# Patient Record
Sex: FEMALE | Race: BLACK OR AFRICAN AMERICAN | Employment: PART TIME | URBAN - METROPOLITAN AREA
[De-identification: names, ages, dates, MRNs, and addresses within clinical notes are randomized per-mention and may not be internally consistent; named-entity substitution may affect disease eponyms.]

---

## 2017-01-30 ENCOUNTER — OFFICE VISIT (OUTPATIENT)
Dept: FAMILY MEDICINE CLINIC | Age: 52
End: 2017-01-30

## 2017-01-30 VITALS
TEMPERATURE: 98.1 F | SYSTOLIC BLOOD PRESSURE: 171 MMHG | WEIGHT: 161 LBS | RESPIRATION RATE: 18 BRPM | HEIGHT: 62 IN | HEART RATE: 78 BPM | BODY MASS INDEX: 29.63 KG/M2 | OXYGEN SATURATION: 100 % | DIASTOLIC BLOOD PRESSURE: 90 MMHG

## 2017-01-30 DIAGNOSIS — J06.9 UPPER RESPIRATORY TRACT INFECTION, UNSPECIFIED TYPE: Primary | ICD-10-CM

## 2017-01-30 RX ORDER — PROMETHAZINE HYDROCHLORIDE AND DEXTROMETHORPHAN HYDROBROMIDE 6.25; 15 MG/5ML; MG/5ML
SYRUP ORAL
Qty: 180 ML | Refills: 0 | Status: SHIPPED | OUTPATIENT
Start: 2017-01-30 | End: 2017-09-21 | Stop reason: ALTCHOICE

## 2017-01-30 NOTE — MR AVS SNAPSHOT
Visit Information Date & Time Provider Department Dept. Phone Encounter #  
 1/30/2017 11:15 AM Stephan Portillo MD [address] [facility] [phone] [id number] Follow-up Instructions Return if symptoms worsen or fail to improve. Upcoming Health Maintenance Date Due  
 PAP AKA CERVICAL CYTOLOGY 3/30/2014 INFLUENZA AGE 9 TO ADULT 8/1/2016 DTaP/Tdap/Td series (2 - Td) 3/30/2021 Allergies as of 1/30/2017  Review Complete On: 1/30/2017 By: Stacia Segal LPN No Known Allergies Current Immunizations  Never Reviewed Name Date TDAP Vaccine 3/30/2011 Not reviewed this visit You Were Diagnosed With   
  
 Codes Comments Upper respiratory tract infection, unspecified type    -  Primary ICD-10-CM: J06.9 ICD-9-CM: 465.9 Vitals BP Pulse Temp Resp Height(growth percentile) Weight(growth percentile) 171/90 (BP 1 Location: Left arm, BP Patient Position: Sitting) 78 98.1 °F (36.7 °C) (Oral) 18 5' 2\" (1.575 m) 161 lb (73 kg) SpO2 BMI OB Status Smoking Status 100% 29.45 kg/m2 Hysterectomy Never Smoker Vitals History BMI and BSA Data Body Mass Index Body Surface Area  
 29.45 kg/m 2 1.79 m 2 Preferred Pharmacy Pharmacy Name Phone [id number] [phone] Your Updated Medication List  
  
   
This list is accurate as of: 1/30/17 12:33 PM.  Always use your most recent med list. DELMY-SELTZER PLUS COLD (PE) 2-7.8-325 mg Tbef Generic drug:  Chlorphenir-Phenylephrn-Aspirn Take  by mouth. ALPRAZolam 0.5 mg tablet Commonly known as:  Dana Mynor Take 1 Tab by mouth two (2) times daily as needed for Anxiety or Sleep. Max Daily Amount: 1 mg.  
  
 lactase 9,000 unit Chew Commonly known as:  Lactose Fast Acting Please take 1 tablet 15-20 minutes before taking any dairy product  Indications: LACTOSE INTOLERANCE  
  
 metoprolol succinate 25 mg XL tablet Commonly known as:  TOPROL-XL Take 1 Tab by mouth daily. naproxen 500 mg tablet Commonly known as:  NAPROSYN Take 500 mg by mouth two (2) times daily as needed. promethazine-dextromethorphan 6.25-15 mg/5 mL syrup Commonly known as:  PROMETHAZINE-DM  
1 teasp po qid prn cough Prescriptions Sent to Pharmacy Refills  
 promethazine-dextromethorphan (PROMETHAZINE-DM) 6.25-15 mg/5 mL syrup 0 Si teasp po qid prn cough Class: Normal  
 Pharmacy: [address] [place] [address] #: [phone] Follow-up Instructions Return if symptoms worsen or fail to improve. Introducing [facility]! Evaristo Blanton introduces [employer] patient portal. Now you can access parts of your medical record, email your doctor's office, and request medication refills online. 1. In your internet browser, go to [email or web address] 2. Click on the First Time User? Click Here link in the Sign In box. You will see the New Member Sign Up page. 3. Enter your [employer] Access Code exactly as it appears below. You will not need to use this code after youve completed the sign-up process. If you do not sign up before the expiration date, you must request a new code. · [employer] Access Code: [email or web address] Expires: 2017  3:44 PM 
 
4. Enter the last four digits of your Social Security Number (xxxx) and Date of Birth (mm/dd/yyyy) as indicated and click Submit. You will be taken to the next sign-up page. 5. Create a Fort[employer] Software ID. This will be your [employer] Software login ID and cannot be changed, so think of one that is secure and easy to remember. 6. Create a [employer] Software password. You can change your password at any time. 7. Enter your Password Reset Question and Answer. This can be used at a later time if you forget your password. 8. Enter your e-mail address. You will receive e-mail notification when new information is available in [address] 9. Click Sign Up. You can now view and download portions of your medical record. 10. Click the Download Summary menu link to download a portable copy of your medical information. If you have questions, please visit the Frequently Asked Questions section of the vogogo website. Remember, vogogo is NOT to be used for urgent needs. For medical emergencies, dial 911. Now available from your iPhone and Android! Please provide this summary of care documentation to your next provider. Your primary care clinician is listed as [facility]. If you have any questions after today's visit, please call [phone].

## 2017-01-30 NOTE — PROGRESS NOTES
HISTORY OF PRESENT ILLNESS  Judy Valles is a 54 y.o. female pt of Dr. Kathe Corona, here today w nasal congestion and non productive cough since last week, no fever. She is taking OTC Susana Tavares cold and also using nasal deongestant spray but continues to have nasal congestion. Cold Symptoms   The history is provided by the patient. This is a new problem. The current episode started more than 2 days ago. The problem has not changed since onset. There has been no fever. Review of Systems   Constitutional: Negative for fever. HENT: Positive for congestion. Respiratory: Positive for cough. Physical Exam   Constitutional: She is oriented to person, place, and time. She appears well-developed and well-nourished. /90 (BP 1 Location: Left arm, BP Patient Position: Sitting)  Pulse 78  Temp 98.1 °F (36.7 °C) (Oral)   Resp 18  Ht 5' 2\" (1.575 m)  Wt 161 lb (73 kg)  SpO2 100%  BMI 29.45 kg/m2     HENT:   Mouth/Throat: Oropharynx is clear and moist.   TMs clear bilaterally   Cardiovascular: Normal heart sounds. Pulmonary/Chest: Breath sounds normal.   Lymphadenopathy:     She has no cervical adenopathy. Neurological: She is alert and oriented to person, place, and time. Nursing note and vitals reviewed.     Patient Active Problem List   Diagnosis Code    Skin tags     Lateral epicondylitis  of elbow M77.10    Knee pain, bilateral M25.561, M25.562    Sjoegren syndrome (HCC) M35.00    SS-A antibody positive R76.8    Right shoulder pain M25.511    Cervical pain (neck) M54.2    Elevated BP I10    Panic attack as reaction to stress F41.0, F43.0    Elevated heart rate and blood pressure R09.89, I10    Panic disorder with agoraphobia F40.01     Past Medical History   Diagnosis Date    Cervical pain (neck) 1/27/2015    Elevated BP 6/6/2016    Elevated heart rate and blood pressure 6/30/2016    Panic attack as reaction to stress 6/30/2016    Panic disorder with agoraphobia 6/30/2016    Right shoulder pain 1/27/2015    Thyroid nodule      Social History     Social History    Marital status:      Spouse name: N/A    Number of children: N/A    Years of education: N/A     Social History Main Topics    Smoking status: Never Smoker    Smokeless tobacco: Never Used    Alcohol use No    Drug use: No    Sexual activity: Yes     Partners: Male     Birth control/ protection: Surgical     Other Topics Concern    None     Social History Narrative     Family History   Problem Relation Age of Onset    Hypertension Mother     Hypertension Sister      Current Outpatient Prescriptions   Medication Sig    Chlorphenir-Phenylephrn-Aspirn (DELMY-SELTZER PLUS COLD, PE,) 2-7.8-325 mg tbef Take  by mouth.  promethazine-dextromethorphan (PROMETHAZINE-DM) 6.25-15 mg/5 mL syrup 1 teasp po qid prn cough    ALPRAZolam (XANAX) 0.5 mg tablet Take 1 Tab by mouth two (2) times daily as needed for Anxiety or Sleep. Max Daily Amount: 1 mg.  metoprolol succinate (TOPROL-XL) 25 mg XL tablet Take 1 Tab by mouth daily.  lactase (LACTOSE FAST ACTING) 9,000 unit chew Please take 1 tablet 15-20 minutes before taking any dairy product  Indications: LACTOSE INTOLERANCE    naproxen (NAPROSYN) 500 mg tablet Take 500 mg by mouth two (2) times daily as needed. No Known Allergies      ASSESSMENT and PLAN  Stop nasal decongestant spray, switch to nasal steroid w saline nasal spray. Cough syrup as prescribed, follow up if not improving. Care plan reviewed and pt understands. After visit summary printed and reviewed with patient. Crayton Kussmaul was seen today for cold symptoms. Diagnoses and all orders for this visit:    Upper respiratory tract infection, unspecified type  -     promethazine-dextromethorphan (PROMETHAZINE-DM) 6.25-15 mg/5 mL syrup; 1 teasp po qid prn cough      Follow-up Disposition:  Return if symptoms worsen or fail to improve.   reviewed medications and side effects in detail

## 2017-01-30 NOTE — LETTER
NOTIFICATION RETURN TO WORK / SCHOOL 
 
1/30/2017 12:31 PM 
 
Ms. Mao Youssef [address] [place] [address] [address] [phone] To Whom It May Concern: 
 
Mao Youssef is currently under the care of [facility]. She will return to work/school on: January 31, 2017. If there are questions or concerns please have the patient contact our office.  
 
 
 
Sincerely, 
 
 
Marsha Rendon MD

## 2017-09-12 DIAGNOSIS — F40.01 PANIC DISORDER WITH AGORAPHOBIA: ICD-10-CM

## 2017-09-12 DIAGNOSIS — F43.0 PANIC ATTACK AS REACTION TO STRESS: ICD-10-CM

## 2017-09-12 DIAGNOSIS — F41.0 PANIC ATTACK AS REACTION TO STRESS: ICD-10-CM

## 2017-09-12 NOTE — TELEPHONE ENCOUNTER
Pt.states that she needs a few blood pressure pills until she see  metoprolol succinate (TOPROL-XL) 25 mg XL tablet,she said she can't get a refill right now her call back # [phone]

## 2017-09-13 RX ORDER — METOPROLOL SUCCINATE 25 MG/1
25 TABLET, EXTENDED RELEASE ORAL DAILY
Qty: 30 TAB | Refills: 0 | Status: SHIPPED | OUTPATIENT
Start: 2017-09-13 | End: 2017-09-21 | Stop reason: SDUPTHER

## 2017-09-13 NOTE — TELEPHONE ENCOUNTER
----- Message from Rashid Caruso sent at 9/12/2017  2:54 PM EDT -----  Regarding: Dr. Susanne Monteiro  Pt stated she left a message this morning stating she was out of bp medications and would like a call back from the nurse to see if a Rx could be called into [address] [address]  [phone]. Best contact number [phone].

## 2017-09-21 ENCOUNTER — OFFICE VISIT (OUTPATIENT)
Dept: FAMILY MEDICINE CLINIC | Age: 52
End: 2017-09-21

## 2017-09-21 VITALS
DIASTOLIC BLOOD PRESSURE: 89 MMHG | BODY MASS INDEX: 29.81 KG/M2 | HEART RATE: 66 BPM | WEIGHT: 162 LBS | TEMPERATURE: 97 F | HEIGHT: 62 IN | OXYGEN SATURATION: 100 % | SYSTOLIC BLOOD PRESSURE: 142 MMHG | RESPIRATION RATE: 14 BRPM

## 2017-09-21 DIAGNOSIS — F41.0 PANIC ATTACK AS REACTION TO STRESS: ICD-10-CM

## 2017-09-21 DIAGNOSIS — F43.0 PANIC ATTACK AS REACTION TO STRESS: ICD-10-CM

## 2017-09-21 DIAGNOSIS — I10 HTN, GOAL BELOW 140/90: Primary | ICD-10-CM

## 2017-09-21 DIAGNOSIS — F40.01 PANIC DISORDER WITH AGORAPHOBIA: ICD-10-CM

## 2017-09-21 LAB
BILIRUB UR QL STRIP: NEGATIVE
GLUCOSE UR-MCNC: NEGATIVE MG/DL
KETONES P FAST UR STRIP-MCNC: NORMAL MG/DL
PH UR STRIP: 5 [PH] (ref 4.6–8)
PROT UR QL STRIP: NEGATIVE MG/DL
SP GR UR STRIP: 1.02 (ref 1–1.03)
UA UROBILINOGEN AMB POC: NORMAL (ref 0.2–1)
URINALYSIS CLARITY POC: CLEAR
URINALYSIS COLOR POC: YELLOW
URINE BLOOD POC: NEGATIVE
URINE LEUKOCYTES POC: NEGATIVE
URINE NITRITES POC: NEGATIVE

## 2017-09-21 RX ORDER — METOPROLOL SUCCINATE 25 MG/1
25 TABLET, EXTENDED RELEASE ORAL DAILY
Qty: 90 TAB | Refills: 3 | Status: SHIPPED | OUTPATIENT
Start: 2017-09-21 | End: 2018-04-25 | Stop reason: SDUPTHER

## 2017-09-21 NOTE — MR AVS SNAPSHOT
Visit Information Date & Time Provider Department Dept. Phone Encounter #  
 9/21/2017 12:30 PM Jonathan Lyons MD [facility] [phone] [id number] Follow-up Instructions Return in about 1 year (around 9/21/2018), or if symptoms worsen or fail to improve. Upcoming Health Maintenance Date Due  
 PAP AKA CERVICAL CYTOLOGY 3/30/2014 DTaP/Tdap/Td series (2 - Td) 3/30/2021 Allergies as of 9/21/2017  Review Complete On: 9/21/2017 By: Stacia Murrell LPN No Known Allergies Current Immunizations  Reviewed on 9/21/2017 Name Date TDAP Vaccine 3/30/2011 Reviewed by Jonathan Lyons MD on 9/21/2017 at  1:43 PM  
You Were Diagnosed With   
  
 Codes Comments HTN, goal below 140/90    -  Primary ICD-10-CM: I10 
ICD-9-CM: 401.9 Panic attack as reaction to stress     ICD-10-CM: F41.0, F43.0 ICD-9-CM: 308.0 Panic disorder with agoraphobia     ICD-10-CM: F40.01 
ICD-9-CM: 300.21 Vitals BP Pulse Temp Resp Height(growth percentile) Weight(growth percentile) 142/89 (BP 1 Location: Left arm, BP Patient Position: At rest) 66 97 °F (36.1 °C) (Oral) 14 5' 2\" (1.575 m) 162 lb (73.5 kg) SpO2 BMI OB Status Smoking Status 100% 29.63 kg/m2 Hysterectomy Never Smoker Vitals History BMI and BSA Data Body Mass Index Body Surface Area  
 29.63 kg/m 2 1.79 m 2 Preferred Pharmacy Pharmacy Name Phone [id number] [address] [phone] Your Updated Medication List  
  
   
This list is accurate as of: 9/21/17  1:46 PM.  Always use your most recent med list.  
  
  
  
  
 ALPRAZolam 0.5 mg tablet Commonly known as:  Conchita Staelma Take 1 Tab by mouth two (2) times daily as needed for Anxiety or Sleep. Max Daily Amount: 1 mg.  
  
 lactase 9,000 unit Chew Commonly known as:  Lactose Fast Acting Please take 1 tablet 15-20 minutes before taking any dairy product  Indications: LACTOSE INTOLERANCE  
  
 metoprolol succinate 25 mg XL tablet Commonly known as:  TOPROL-XL Take 1 Tab by mouth daily. Prescriptions Sent to Pharmacy Refills  
 metoprolol succinate (TOPROL-XL) 25 mg XL tablet 3 Sig: Take 1 Tab by mouth daily. Class: Normal  
 Pharmacy: [address] [place] [address] #: [phone] Route: Oral  
  
We Performed the Following AMB POC URINALYSIS DIP STICK AUTO W/O MICRO [11556 CPT(R)] CBC W/O DIFF [55908 CPT(R)] CHOLESTEROL, TOTAL [98094 CPT(R)] HDL CHOLESTEROL [86083 CPT(R)] METABOLIC PANEL, COMPREHENSIVE [60652 CPT(R)] TSH 3RD GENERATION [41041 CPT(R)] Follow-up Instructions Return in about 1 year (around 9/21/2018), or if symptoms worsen or fail to improve. Introducing [facility]! David Hines introduces [employer] patient portal. Now you can access parts of your medical record, email your doctor's office, and request medication refills online. 1. In your internet browser, go to [email or web address] Keys 2. Click on the First Time User? Click Here link in the Sign In box. You will see the New Member Sign Up page. 3. Enter your [employer] [place] Access Code exactly as it appears below. You will not need to use this code after youve completed the sign-up process. If you do not sign up before the expiration date, you must request a new code. · [employer] Access Code: [email or web address] Expires: 12/20/2017  1:44 PM 
 
4. Enter the last four digits of your Social Security Number (xxxx) and Date of Birth (mm/dd/yyyy) as indicated and click Submit. You will be taken to the next sign-up page. 5. Create a [employer] [place] ID. This will be your [employer] login ID and cannot be changed, so think of one that is secure and easy to remember. 6. Create a Webjam password. You can change your password at any time. 7. Enter your Password Reset Question and Answer. This can be used at a later time if you forget your password. 8. Enter your e-mail address. You will receive e-mail notification when new information is available in [address] 9. Click Sign Up. You can now view and download portions of your medical record. 10. Click the Download Summary menu link to download a portable copy of your medical information. If you have questions, please visit the Frequently Asked Questions section of the Webjam website. Remember, Webjam is NOT to be used for urgent needs. For medical emergencies, dial 911. Now available from your iPhone and Android! Please provide this summary of care documentation to your next provider. Your primary care clinician is listed as Mihir Yi. If you have any questions after today's visit, please call [phone].

## 2017-09-21 NOTE — PROGRESS NOTES
HISTORY OF PRESENT ILLNESS  Sunday Qiana is a 54 y.o. female. HPI         HTN  Today pt present for Bp check and the patient stating that so far there has been a Compliancy w/ the bp meds, she is having had the low salt diet   the patient has been active   She does do the daily walking at home,    pt states that patien does not obtain the bp at home ,   today the pt denies Chest Pain, has no legs swelling no lightheadedness,  the pat has been feeling anxious,but no H, no S ideation  and  Has not been feeling stressed out,   otherwise feeling better since the last visit      Current Outpatient Prescriptions   Medication Sig Dispense Refill    metoprolol succinate (TOPROL-XL) 25 mg XL tablet Take 1 Tab by mouth daily. 90 Tab 3    ALPRAZolam (XANAX) 0.5 mg tablet Take 1 Tab by mouth two (2) times daily as needed for Anxiety or Sleep.  Max Daily Amount: 1 mg. 60 Tab 1    lactase (LACTOSE FAST ACTING) 9,000 unit chew Please take 1 tablet 15-20 minutes before taking any dairy product  Indications: LACTOSE INTOLERANCE 90 Tab 3     No Known Allergies  Past Medical History:   Diagnosis Date    Cervical pain (neck) 1/27/2015    Elevated BP 6/6/2016    Elevated heart rate and blood pressure 6/30/2016    HTN, goal below 140/90 9/21/2017    Panic attack as reaction to stress 6/30/2016    Panic disorder with agoraphobia 6/30/2016    Right shoulder pain 1/27/2015    Thyroid nodule      Past Surgical History:   Procedure Laterality Date    HX GYN      HX HYSTERECTOMY  2012    THYROIDECTOMY  2002     Family History   Problem Relation Age of Onset    Hypertension Mother     Hypertension Sister      Social History   Substance Use Topics    Smoking status: Never Smoker    Smokeless tobacco: Never Used    Alcohol use No      Lab Results  Component Value Date/Time   WBC 4.8 07/02/2016 10:57 AM   HGB 13.5 07/02/2016 10:57 AM   HCT 39.1 07/02/2016 10:57 AM   PLATELET 372 76/61/9450 10:57 AM   MCV 87.7 07/02/2016 10:57 AM     Lab Results  Component Value Date/Time   GFR est non-AA >60 07/02/2016 10:57 AM   GFR est AA >60 07/02/2016 10:57 AM   Creatinine 0.96 07/02/2016 10:57 AM   BUN 13 07/02/2016 10:57 AM   Sodium 143 07/02/2016 10:57 AM   Potassium 3.5 07/02/2016 10:57 AM   Chloride 108 07/02/2016 10:57 AM   CO2 26 07/02/2016 10:57 AM   Magnesium 2.2 07/02/2016 10:57 AM        Review of Systems   Constitutional: Negative for chills and fever. HENT: Negative for ear pain and nosebleeds. Eyes: Negative for blurred vision, pain and discharge. Respiratory: Negative for shortness of breath. Cardiovascular: Negative for chest pain and leg swelling. Gastrointestinal: Negative for constipation, diarrhea, nausea and vomiting. Genitourinary: Negative for frequency. Musculoskeletal: Negative for joint pain. Skin: Negative for itching and rash. Neurological: Negative for headaches. Psychiatric/Behavioral: Negative for depression. The patient is not nervous/anxious. Physical Exam   Constitutional: She is oriented to person, place, and time. She appears well-developed and well-nourished. HENT:   Head: Normocephalic and atraumatic. Eyes: Conjunctivae and EOM are normal.   Neck: Normal range of motion. Neck supple. Cardiovascular: Normal rate, regular rhythm and normal heart sounds. No murmur heard. Pulmonary/Chest: Effort normal and breath sounds normal.   Abdominal: Soft. Bowel sounds are normal. She exhibits no distension. Musculoskeletal: Normal range of motion. She exhibits no edema. Neurological: She is alert and oriented to person, place, and time. Skin: No erythema. Psychiatric: Her behavior is normal.   Nursing note and vitals reviewed. ASSESSMENT and PLAN  Diagnoses and all orders for this visit:    1. HTN, goal below 140/90    2.  Panic attack as reaction to stress  -     CBC W/O DIFF  -     AMB POC URINALYSIS DIP STICK AUTO W/O MICRO  -     TSH 3RD GENERATION  -     METABOLIC PANEL, COMPREHENSIVE  -     metoprolol succinate (TOPROL-XL) 25 mg XL tablet; Take 1 Tab by mouth daily.  -     CHOLESTEROL, TOTAL  -     HDL CHOLESTEROL    3. Panic disorder with agoraphobia  -     CBC W/O DIFF  -     AMB POC URINALYSIS DIP STICK AUTO W/O MICRO  -     TSH 3RD GENERATION  -     METABOLIC PANEL, COMPREHENSIVE  -     metoprolol succinate (TOPROL-XL) 25 mg XL tablet;  Take 1 Tab by mouth daily.  -     CHOLESTEROL, TOTAL  -     HDL CHOLESTEROL

## 2017-09-21 NOTE — PROGRESS NOTES
Allie Ayala          Name and  verified          Chief Complaint   Patient presents with    Hypertension         Health Maintenance reviewed-discussed with patient.       Patient stated upcoming appointment PAP exam.

## 2017-09-22 LAB
ALBUMIN SERPL-MCNC: 4.8 G/DL (ref 3.5–5.5)
ALBUMIN/GLOB SERPL: 1.8 {RATIO} (ref 1.2–2.2)
ALP SERPL-CCNC: 86 IU/L (ref 39–117)
ALT SERPL-CCNC: 19 IU/L (ref 0–32)
AST SERPL-CCNC: 20 IU/L (ref 0–40)
BILIRUB SERPL-MCNC: 0.2 MG/DL (ref 0–1.2)
BUN SERPL-MCNC: 13 MG/DL (ref 6–24)
BUN/CREAT SERPL: 15 (ref 9–23)
CALCIUM SERPL-MCNC: 9.8 MG/DL (ref 8.7–10.2)
CHLORIDE SERPL-SCNC: 103 MMOL/L (ref 96–106)
CHOLEST SERPL-MCNC: 177 MG/DL (ref 100–199)
CO2 SERPL-SCNC: 22 MMOL/L (ref 18–29)
CREAT SERPL-MCNC: 0.87 MG/DL (ref 0.57–1)
ERYTHROCYTE [DISTWIDTH] IN BLOOD BY AUTOMATED COUNT: 13.4 % (ref 12.3–15.4)
GLOBULIN SER CALC-MCNC: 2.7 G/DL (ref 1.5–4.5)
GLUCOSE SERPL-MCNC: 81 MG/DL (ref 65–99)
HCT VFR BLD AUTO: 40.1 % (ref 34–46.6)
HDLC SERPL-MCNC: 65 MG/DL
HGB BLD-MCNC: 13.6 G/DL (ref 11.1–15.9)
MCH RBC QN AUTO: 29.8 PG (ref 26.6–33)
MCHC RBC AUTO-ENTMCNC: 33.9 G/DL (ref 31.5–35.7)
MCV RBC AUTO: 88 FL (ref 79–97)
PLATELET # BLD AUTO: 284 X10E3/UL (ref 150–379)
POTASSIUM SERPL-SCNC: 3.9 MMOL/L (ref 3.5–5.2)
PROT SERPL-MCNC: 7.5 G/DL (ref 6–8.5)
RBC # BLD AUTO: 4.57 X10E6/UL (ref 3.77–5.28)
SODIUM SERPL-SCNC: 145 MMOL/L (ref 134–144)
TSH SERPL DL<=0.005 MIU/L-ACNC: 1.56 UIU/ML (ref 0.45–4.5)
WBC # BLD AUTO: 4.7 X10E3/UL (ref 3.4–10.8)

## 2018-03-06 ENCOUNTER — HOSPITAL ENCOUNTER (EMERGENCY)
Age: 53
Discharge: HOME | End: 2018-03-06
Attending: EMERGENCY MEDICINE | Admitting: EMERGENCY MEDICINE
Payer: COMMERCIAL

## 2018-03-06 VITALS
OXYGEN SATURATION: 100 % | SYSTOLIC BLOOD PRESSURE: 173 MMHG | TEMPERATURE: 98.4 F | HEIGHT: 62 IN | BODY MASS INDEX: 27.22 KG/M2 | HEART RATE: 84 BPM | WEIGHT: 147.93 LBS | RESPIRATION RATE: 16 BRPM | DIASTOLIC BLOOD PRESSURE: 107 MMHG

## 2018-03-06 DIAGNOSIS — I10 HYPERTENSION, UNSPECIFIED TYPE: ICD-10-CM

## 2018-03-06 DIAGNOSIS — Z20.2 EXPOSURE TO SEXUALLY TRANSMITTED DISEASE (STD): Primary | ICD-10-CM

## 2018-03-06 LAB
APPEARANCE UR: CLEAR
BACTERIA URNS QL MICRO: NEGATIVE /HPF
BILIRUB UR QL: NEGATIVE
CLUE CELLS VAG QL WET PREP: NORMAL
COLOR UR: ABNORMAL
EPITH CASTS URNS QL MICRO: ABNORMAL /LPF
GLUCOSE UR STRIP.AUTO-MCNC: NEGATIVE MG/DL
HGB UR QL STRIP: NEGATIVE
HYALINE CASTS URNS QL MICRO: ABNORMAL /LPF (ref 0–5)
KETONES UR QL STRIP.AUTO: NEGATIVE MG/DL
LEUKOCYTE ESTERASE UR QL STRIP.AUTO: ABNORMAL
NITRITE UR QL STRIP.AUTO: NEGATIVE
PH UR STRIP: 6 [PH] (ref 5–8)
PROT UR STRIP-MCNC: NEGATIVE MG/DL
RBC #/AREA URNS HPF: ABNORMAL /HPF (ref 0–5)
SP GR UR REFRACTOMETRY: 1.02 (ref 1–1.03)
T VAGINALIS VAG QL WET PREP: NORMAL
UA: UC IF INDICATED,UAUC: ABNORMAL
UROBILINOGEN UR QL STRIP.AUTO: 1 EU/DL (ref 0.2–1)
WBC URNS QL MICRO: ABNORMAL /HPF (ref 0–4)

## 2018-03-06 PROCEDURE — 81001 URINALYSIS AUTO W/SCOPE: CPT | Performed by: EMERGENCY MEDICINE

## 2018-03-06 PROCEDURE — 74011250637 HC RX REV CODE- 250/637: Performed by: EMERGENCY MEDICINE

## 2018-03-06 PROCEDURE — 96372 THER/PROPH/DIAG INJ SC/IM: CPT

## 2018-03-06 PROCEDURE — 74011000250 HC RX REV CODE- 250: Performed by: EMERGENCY MEDICINE

## 2018-03-06 PROCEDURE — 87210 SMEAR WET MOUNT SALINE/INK: CPT | Performed by: EMERGENCY MEDICINE

## 2018-03-06 PROCEDURE — 74011250636 HC RX REV CODE- 250/636: Performed by: EMERGENCY MEDICINE

## 2018-03-06 PROCEDURE — 99284 EMERGENCY DEPT VISIT MOD MDM: CPT

## 2018-03-06 PROCEDURE — 87591 N.GONORRHOEAE DNA AMP PROB: CPT | Performed by: EMERGENCY MEDICINE

## 2018-03-06 RX ORDER — AZITHROMYCIN 250 MG/1
1000 TABLET, FILM COATED ORAL
Status: DISCONTINUED | OUTPATIENT
Start: 2018-03-06 | End: 2018-03-06 | Stop reason: SDUPTHER

## 2018-03-06 RX ORDER — AZITHROMYCIN 250 MG/1
1000 TABLET, FILM COATED ORAL
Status: COMPLETED | OUTPATIENT
Start: 2018-03-06 | End: 2018-03-06

## 2018-03-06 RX ADMIN — AZITHROMYCIN 1000 MG: 250 TABLET, FILM COATED ORAL at 19:48

## 2018-03-06 RX ADMIN — LIDOCAINE HYDROCHLORIDE 250 MG: 10 INJECTION, SOLUTION EPIDURAL; INFILTRATION; INTRACAUDAL; PERINEURAL at 19:36

## 2018-03-06 NOTE — LETTER
[address] 
[facility] EMERGENCY DEPT 
[address] [phone] 
[phone] Work/School Note Date: 3/6/2018 To Whom It May concern: 
 
Carmen Lenz was seen and treated today in the emergency room by the following provider(s): 
Attending Provider: Oneil Bosworth, DO. Carmen Lenz may return to work on 3/7/2018. Sincerely, Femi Gonzalez RN

## 2018-03-06 NOTE — ED NOTES
Assumed care of patient. Patient is alert and oriented, does not appear to be in distress. Patient ambulatory to ED with c/o 'not feeling right\" since she found out that her  has been cheating on her. Pt states she had a hysterectomy but noticed some light pink spotting today. Pt states this happened shortly after she had intercourse x 2 weeks ago, pt states she has not had intercourse in about 1 year prior to this.

## 2018-03-06 NOTE — ED PROVIDER NOTES
EMERGENCY DEPARTMENT HISTORY AND PHYSICAL EXAM      Date: 3/6/2018  Patient Name: Mimi Palafox    History of Presenting Illness     Chief Complaint   Patient presents with    Depression     She found out in January that her  is cheating on her and ever since then she has been very depressed and anxious. She does not want to hurt herself but is just getting more and more depressed.  Anxiety       History Provided By: Patient    HPI: Mimi Palafox, 50 y.o. female with PMHx significant for panic disorder with agoraphobia, presents ambulatory to the ED with cc of intermittent vaginal spotting, vaginal discharge, and mild left sided abdominal pain x 2 weeks. Pt reports hx of hysterectomy in 2012 and states she has had no issues with either since the operation. She states she recently found text messages on her spouse's phone telling another woman he loves her and suspects physical infidelity. She notes there has been recent stressors in their marriage including financial stress and lack of intimacy. Pt denies hx of STI. Pt reports since January she has felt more anxious and dysphoric, secondary to her marital issues, but denies SI. She explains she has two children at home and would not harm herself. Pt notes she has anxiety medications at home that she takes PRN to help her sleep. She specifically denies any dysuria. PCP: Morelia Teresa MD    There are no other complaints, changes, or physical findings at this time. Current Outpatient Prescriptions   Medication Sig Dispense Refill    metoprolol succinate (TOPROL-XL) 25 mg XL tablet Take 1 Tab by mouth daily. 90 Tab 3    ALPRAZolam (XANAX) 0.5 mg tablet Take 1 Tab by mouth two (2) times daily as needed for Anxiety or Sleep.  Max Daily Amount: 1 mg. 60 Tab 1    lactase (LACTOSE FAST ACTING) 9,000 unit chew Please take 1 tablet 15-20 minutes before taking any dairy product  Indications: LACTOSE INTOLERANCE 90 Tab 3       Past History     Past Medical History:  Past Medical History:   Diagnosis Date    Cervical pain (neck) 1/27/2015    Elevated BP 6/6/2016    Elevated heart rate and blood pressure 6/30/2016    HTN, goal below 140/90 9/21/2017    Panic attack as reaction to stress 6/30/2016    Panic disorder with agoraphobia 6/30/2016    Right shoulder pain 1/27/2015    Thyroid nodule        Past Surgical History:  Past Surgical History:   Procedure Laterality Date    HX GYN      HX HYSTERECTOMY  2012    THYROIDECTOMY  2002       Family History:  Family History   Problem Relation Age of Onset    Hypertension Mother     Hypertension Sister        Social History:  Social History   Substance Use Topics    Smoking status: Never Smoker    Smokeless tobacco: Never Used    Alcohol use No       Allergies:  No Known Allergies      Review of Systems   Review of Systems   Constitutional: Negative for chills and fever. HENT: Negative for congestion and sore throat. Eyes: Negative for visual disturbance. Respiratory: Negative for cough and shortness of breath. Cardiovascular: Negative for chest pain and leg swelling. Gastrointestinal: Positive for abdominal pain. Negative for blood in stool, diarrhea and nausea. Endocrine: Negative for polyuria. Genitourinary: Positive for vaginal discharge. Negative for dysuria and flank pain.        + vaginal spotting   Musculoskeletal: Negative for myalgias. Skin: Negative for rash. Allergic/Immunologic: Negative for immunocompromised state. Neurological: Negative for weakness and headaches. Psychiatric/Behavioral: Positive for dysphoric mood. Negative for confusion and suicidal ideas. The patient is nervous/anxious. Physical Exam   Physical Exam   Constitutional: She is oriented to person, place, and time. She appears well-developed and well-nourished. HENT:   Head: Normocephalic and atraumatic.    Moist mucous membranes   Eyes: Conjunctivae are normal. Pupils are equal, round, and reactive to light. Right eye exhibits no discharge. Left eye exhibits no discharge. Neck: Normal range of motion. Neck supple. No tracheal deviation present. Cardiovascular: Normal rate, regular rhythm and normal heart sounds. No murmur heard. Pulmonary/Chest: Effort normal and breath sounds normal. No respiratory distress. She has no wheezes. She has no rales. Abdominal: Soft. Bowel sounds are normal. There is no tenderness. There is no rebound and no guarding. Musculoskeletal: Normal range of motion. She exhibits no edema, tenderness or deformity. Neurological: She is alert and oriented to person, place, and time. Skin: Skin is warm and dry. No rash noted. No erythema. Psychiatric: Her behavior is normal.   Nursing note and vitals reviewed. Diagnostic Study Results     Labs -     Recent Results (from the past 12 hour(s))   URINALYSIS W/ REFLEX CULTURE    Collection Time: 03/06/18  7:00 PM   Result Value Ref Range    Color YELLOW/STRAW      Appearance CLEAR CLEAR      Specific gravity 1.018 1.003 - 1.030      pH (UA) 6.0 5.0 - 8.0      Protein NEGATIVE  NEG mg/dL    Glucose NEGATIVE  NEG mg/dL    Ketone NEGATIVE  NEG mg/dL    Bilirubin NEGATIVE  NEG      Blood NEGATIVE  NEG      Urobilinogen 1.0 0.2 - 1.0 EU/dL    Nitrites NEGATIVE  NEG      Leukocyte Esterase TRACE (A) NEG      WBC 0-4 0 - 4 /hpf    RBC 0-5 0 - 5 /hpf    Epithelial cells FEW FEW /lpf    Bacteria NEGATIVE  NEG /hpf    UA:UC IF INDICATED CULTURE NOT INDICATED BY UA RESULT CNI      Hyaline cast 0-2 0 - 5 /lpf   WET PREP    Collection Time: 03/06/18  7:53 PM   Result Value Ref Range    Clue cells CLUE CELLS ABSENT      Wet prep NO TRICHOMONAS SEEN         Medical Decision Making   I am the first provider for this patient. I reviewed the vital signs, available nursing notes, past medical history, past surgical history, family history and social history. Vital Signs-Reviewed the patient's vital signs.   Patient Vitals for the past 12 hrs:   Temp Pulse Resp BP SpO2   03/06/18 1649 98.4 °F (36.9 °C) 84 16 (!) 173/107 100 %       Pulse Oximetry Analysis - 100% on RA    Cardiac Monitor:   Rate: 84 bpm  Rhythm: Normal Sinus Rhythm      Records Reviewed: Nursing Notes and Old Medical Records    Provider Notes (Medical Decision Making):     Pt here for STD check. Will treat empirically. ED Course:   Initial assessment performed. The patients presenting problems have been discussed, and they are in agreement with the care plan formulated and outlined with them. I have encouraged them to ask questions as they arise throughout their visit. PROGRESS NOTE:  6:45 PM  Pt requesting empiric treatment. Written by Nikki Woodard ED Scribe, as dictated by Blanca Gibson DO. Disposition:    DISCHARGE NOTE:  7:43 PM  The patient is ready for discharge. The patients signs, symptoms, diagnosis, and instructions for discharge have been discussed and the pt has conveyed their understanding. The patient is to follow up as recommended with PCP or return to the ER should their symptoms worsen. Plan has been discussed and patient has conveyed their agreement. PLAN:  1. Discharge home   2. Follow-up Information     Follow up With Details Comments Contact Info    Sandeep Butcher MD Call in 1 day  [address]  [phone]      [facility] EMERGENCY DEPT  If symptoms worsen [address] [place] [address]  [phone]        Return to ED if worse     Diagnosis     Clinical Impression:   1. Exposure to sexually transmitted disease (STD)    2. Hypertension, unspecified type        Attestations: This note is prepared by Nikki Woodard, acting as Scribe for Blanca Gibson DO. Blanca Gibson DO: The scribe's documentation has been prepared under my direction and personally reviewed by me in its entirety.  I confirm that the note above accurately reflects all work, treatment, procedures, and medical decision making performed by me.

## 2018-03-07 LAB
C TRACH DNA SPEC QL NAA+PROBE: NEGATIVE
N GONORRHOEA DNA SPEC QL NAA+PROBE: NEGATIVE
SAMPLE TYPE: NORMAL
SERVICE CMNT-IMP: NORMAL
SPECIMEN SOURCE: NORMAL

## 2018-03-07 NOTE — DISCHARGE INSTRUCTIONS
Exposure to Sexually Transmitted Infections: Care Instructions  Your Care Instructions    Sexually transmitted infections (STIs) are those diseases spread by sexual contact. There are at least 20 different STIs, including chlamydia, gonorrhea, syphilis, and human immunodeficiency virus (HIV), which causes AIDS. Bacteria-caused STIs can be treated and cured. STIs caused by viruses, such as HIV, can be treated but not cured. Some STIs can reduce a woman's chances of getting pregnant in the future. STIs are spread during sexual contact, such as vaginal intercourse and oral or anal sex. Follow-up care is a key part of your treatment and safety. Be sure to make and go to all appointments, and call your doctor if you are having problems. It's also a good idea to know your test results and keep a list of the medicines you take. How can you care for yourself at home? · Your doctor may have given you a shot of antibiotics. If your doctor prescribed antibiotic pills, take them as directed. Do not stop taking them just because you feel better. You need to take the full course of antibiotics. · Do not have sexual contact while you have symptoms of an STI or are being treated for an STI. · Tell your sex partner (or partners) that he or she will need treatment. · If you are a woman, do not douche. Douching changes the normal balance of bacteria in the vagina and may spread an infection up into your reproductive organs. To prevent exposure to STIs in the future  · Use latex condoms every time you have sex. Use them from the beginning to the end of sexual contact. · Talk to your partner before you have sex. Find out if he or she has or is at risk for any STI. Keep in mind that a person may be able to spread an STI even if he or she does not have symptoms. · Do not have sex if you are being treated for an STI. · Do not have sex with anyone who has symptoms of an STI, such as sores on the genitals or mouth.   · Having one sex partner (who does not have STIs and does not have sex with anyone else) is a good way to avoid STIs. When should you call for help? Call your doctor now or seek immediate medical care if:  ? · You have new pain in your belly or pelvis. ? · You have symptoms of a urinary tract infection. These may include:  ¨ Pain or burning when you urinate. ¨ A frequent need to urinate without being able to pass much urine. ¨ Pain in the flank, which is just below the rib cage and above the waist on either side of the back. ¨ Blood in your urine. ¨ A fever. ? · You have new or worsening pain or swelling in the scrotum. ? Watch closely for changes in your health, and be sure to contact your doctor if:  ? · You have unusual vaginal bleeding. ? · You have a discharge from the vagina or penis. ? · You have any new symptoms, such as sores, bumps, rashes, blisters, or warts. ? · You have itching, tingling, pain, or burning in the genital or anal area. ? · You think you may have an STI. Where can you learn more? Go to http://kings-tavon.info/. Enter H567 in the search box to learn more about \"Exposure to Sexually Transmitted Infections: Care Instructions. \"  Current as of: March 20, 2017  Content Version: 11.4  © 8053-5674 [employer]. Care instructions adapted under license by [employer] (which disclaims liability or warranty for this information). If you have questions about a medical condition or this instruction, always ask your healthcare professional. [place] [address] any warranty or liability for your use of this information.

## 2018-04-25 ENCOUNTER — OFFICE VISIT (OUTPATIENT)
Dept: FAMILY MEDICINE CLINIC | Age: 53
End: 2018-04-25

## 2018-04-25 VITALS
WEIGHT: 147.8 LBS | HEART RATE: 91 BPM | HEIGHT: 62 IN | BODY MASS INDEX: 27.2 KG/M2 | DIASTOLIC BLOOD PRESSURE: 102 MMHG | RESPIRATION RATE: 16 BRPM | TEMPERATURE: 98 F | SYSTOLIC BLOOD PRESSURE: 172 MMHG | OXYGEN SATURATION: 100 %

## 2018-04-25 DIAGNOSIS — F40.01 PANIC DISORDER WITH AGORAPHOBIA: ICD-10-CM

## 2018-04-25 DIAGNOSIS — I10 HTN, GOAL BELOW 140/90: ICD-10-CM

## 2018-04-25 DIAGNOSIS — F06.4 ANXIETY DISORDER DUE TO KNOWN PHYSIOLOGICAL CONDITION: ICD-10-CM

## 2018-04-25 DIAGNOSIS — F43.0 PANIC ATTACK AS REACTION TO STRESS: Primary | ICD-10-CM

## 2018-04-25 DIAGNOSIS — R19.7 DIARRHEA DUE TO MALABSORPTION: ICD-10-CM

## 2018-04-25 DIAGNOSIS — F41.0 PANIC ATTACK AS REACTION TO STRESS: Primary | ICD-10-CM

## 2018-04-25 DIAGNOSIS — K90.9 DIARRHEA DUE TO MALABSORPTION: ICD-10-CM

## 2018-04-25 PROBLEM — F41.9 ANXIETY DISORDER: Status: ACTIVE | Noted: 2018-04-25

## 2018-04-25 RX ORDER — ALPRAZOLAM 0.5 MG/1
0.5 TABLET ORAL
Qty: 30 TAB | Refills: 2 | Status: SHIPPED | OUTPATIENT
Start: 2018-04-25 | End: 2019-09-20 | Stop reason: SDUPTHER

## 2018-04-25 RX ORDER — METOPROLOL SUCCINATE 50 MG/1
50 TABLET, EXTENDED RELEASE ORAL DAILY
Qty: 30 TAB | Refills: 4 | Status: SHIPPED | OUTPATIENT
Start: 2018-04-25 | End: 2018-05-23 | Stop reason: SDUPTHER

## 2018-04-25 RX ORDER — BUPROPION HYDROCHLORIDE 150 MG/1
150 TABLET ORAL
Qty: 30 TAB | Refills: 0 | Status: SHIPPED | OUTPATIENT
Start: 2018-04-25 | End: 2018-05-23 | Stop reason: SDUPTHER

## 2018-04-25 NOTE — MR AVS SNAPSHOT
[address] [facility] [address] 
[phone] Patient: Dany Monroe MRN: [id number] :1965 Visit Information Date & Time Provider Department Dept. Phone Encounter #  
 2018 12:15 PM Waldron Brittle, MD [facility] OFFICE-ANNEX [phone] [id number] Follow-up Instructions Return in about 4 weeks (around 2018), or if symptoms worsen or fail to improve. Upcoming Health Maintenance Date Due  
 PAP AKA CERVICAL CYTOLOGY 3/30/2014 BREAST CANCER SCRN MAMMOGRAM 2017 FOBT Q 1 YEAR AGE 50-75 2017 DTaP/Tdap/Td series (2 - Td) 3/30/2021 Allergies as of 2018  Review Complete On: 2018 By: Sanjuana Sanchez LPN No Known Allergies Current Immunizations  Reviewed on 2017 Name Date TDAP Vaccine 3/30/2011 Not reviewed this visit You Were Diagnosed With   
  
 Codes Comments Panic attack as reaction to stress    -  Primary ICD-10-CM: F41.0, F43.0 ICD-9-CM: 308.0 Anxiety disorder due to known physiological condition     ICD-10-CM: F06.4 ICD-9-CM: 300.00 Panic disorder with agoraphobia     ICD-10-CM: F40.01 
ICD-9-CM: 300.21 Vitals BP Pulse Temp Resp Height(growth percentile) Weight(growth percentile) (!) 172/107 (BP 1 Location: Left arm, BP Patient Position: At rest) 91 98 °F (36.7 °C) (Oral) 16 5' 2\" (1.575 m) 147 lb 12.8 oz (67 kg) SpO2 BMI OB Status Smoking Status 100% 27.03 kg/m2 Hysterectomy Never Smoker BMI and BSA Data Body Mass Index Body Surface Area  
 27.03 kg/m 2 1.71 m 2 Preferred Pharmacy Pharmacy Name Phone [id number] [address] [phone] Your Updated Medication List  
  
   
This list is accurate as of 18  1:24 PM.  Always use your most recent med list.  
  
  
  
  
 ALPRAZolam 0.5 mg tablet Commonly known as:  Jestine Pies Take 1 Tab by mouth nightly as needed for Anxiety or Sleep. Max Daily Amount: 0.5 mg.  
  
 buPROPion  mg tablet Commonly known as:  Luis F Butt Take 1 Tab by mouth every morning. lactase 9,000 unit Chew Commonly known as:  Lactose Fast Acting Please take 1 tablet 15-20 minutes before taking any dairy product  Indications: LACTOSE INTOLERANCE  
  
 metoprolol succinate 50 mg XL tablet Commonly known as:  TOPROL-XL Take 1 Tab by mouth daily. Prescriptions Printed Refills ALPRAZolam (XANAX) 0.5 mg tablet 2 Sig: Take 1 Tab by mouth nightly as needed for Anxiety or Sleep. Max Daily Amount: 0.5 mg.  
 Class: Print Route: Oral  
  
Prescriptions Sent to Pharmacy Refills  
 metoprolol succinate (TOPROL-XL) 50 mg XL tablet 4 Sig: Take 1 Tab by mouth daily. Class: Normal  
 Pharmacy: [address] [place] [address] #: [phone] Route: Oral  
 buPROPion XL (WELLBUTRIN XL) 150 mg tablet 0 Sig: Take 1 Tab by mouth every morning. Class: Normal  
 Pharmacy: [address] [place] [address] #: [phone] Route: Oral  
  
Follow-up Instructions Return in about 4 weeks (around 5/23/2018), or if symptoms worsen or fail to improve. Patient Instructions Anxiety Disorder: Care Instructions Your Care Instructions Anxiety is a normal reaction to stress. Difficult situations can cause you to have symptoms such as sweaty palms and a nervous feeling. In an anxiety disorder, the symptoms are far more severe. Constant worry, muscle tension, trouble sleeping, nausea and diarrhea, and other symptoms can make normal daily activities difficult or impossible. These symptoms may occur for no reason, and they can affect your work, school, or social life. Medicines, counseling, and self-care can all help. Follow-up care is a key part of your treatment and safety. Be sure to make and go to all appointments, and call your doctor if you are having problems. It's also a good idea to know your test results and keep a list of the medicines you take. How can you care for yourself at home? · Take medicines exactly as directed. Call your doctor if you think you are having a problem with your medicine. · Go to your counseling sessions and follow-up appointments. · Recognize and accept your anxiety. Then, when you are in a situation that makes you anxious, say to yourself, \"This is not an emergency. I feel uncomfortable, but I am not in danger. I can keep going even if I feel anxious. \" · Be kind to your body: ¨ Relieve tension with exercise or a massage. ¨ Get enough rest. 
¨ Avoid alcohol, caffeine, nicotine, and illegal drugs. They can increase your anxiety level and cause sleep problems. ¨ Learn and do relaxation techniques. See below for more about these techniques. · Engage your mind. Get out and do something you enjoy. Go to a [employer] movie, or take a walk or hike. Plan your day. Having too much or too little to do can make you anxious. · Keep a record of your symptoms. Discuss your fears with a good friend or family member, or join a support group for people with similar problems. Talking to others sometimes relieves stress. · Get involved in social groups, or volunteer to help others. Being alone sometimes makes things seem worse than they are. · Get at least 30 minutes of exercise on most days of the week to relieve stress. Walking is a good choice. You also may want to do other activities, such as running, swimming, cycling, or playing tennis or team sports. Relaxation techniques Do relaxation exercises 10 to 20 minutes a day. You can play soothing, relaxing music while you do them, if you wish. · Tell others in your house that you are going to do your relaxation exercises. Ask them not to disturb you. · Find a comfortable place, away from all distractions and noise. · Lie down on your back, or sit with your back straight. · Focus on your breathing. Make it slow and steady. · Breathe in through your nose. Breathe out through either your nose or mouth. · Breathe deeply, filling up the area between your navel and your rib cage. Breathe so that your belly goes up and down. · Do not hold your breath. · Breathe like this for 5 to 10 minutes. Notice the feeling of calmness throughout your whole body. As you continue to breathe slowly and deeply, relax by doing the following for another 5 to 10 minutes: · Tighten and relax each muscle group in your body. You can begin at your toes and work your way up to your head. · Imagine your muscle groups relaxing and becoming heavy. · Empty your mind of all thoughts. · Let yourself relax more and more deeply. · Become aware of the state of calmness that surrounds you. · When your relaxation time is over, you can bring yourself back to alertness by moving your fingers and toes and then your hands and feet and then stretching and moving your entire body. Sometimes people fall asleep during relaxation, but they usually wake up shortly afterward. · Always give yourself time to return to full alertness before you drive a car or do anything that might cause an accident if you are not fully alert. Never play a relaxation tape while you drive a car. When should you call for help? Call 911 anytime you think you may need emergency care. For example, call if: 
? · You feel you cannot stop from hurting yourself or someone else. ? Keep the numbers for these national suicide hotlines: 4-849-124-TALK (5-163.449.9798) and 5-279-BOREYMQ (0-323.277.3935). If you or someone you know talks about suicide or feeling hopeless, get help right away. ? Watch closely for changes in your health, and be sure to contact your doctor if: 
? · You have anxiety or fear that affects your life. ? · You have symptoms of anxiety that are new or different from those you had before. Where can you learn more? Go to http://kings-tavon.info/. Enter P754 in the search box to learn more about \"Anxiety Disorder: Care Instructions. \" Current as of: May 12, 2017 Content Version: 11.4 © 4404-5366 [employer]. Care instructions adapted under license by [employer] (which disclaims liability or warranty for this information). If you have questions about a medical condition or this instruction, always ask your healthcare professional. [address] any warranty or liability for your use of this information. Body Mass Index: Care Instructions Your Care Instructions Body mass index (BMI) can help you see if your weight is raising your risk for health problems. It uses a formula to compare how much you weigh with how tall you are. · A BMI lower than 18.5 is considered underweight. · A BMI between 18.5 and 24.9 is considered healthy. · A BMI between 25 and 29.9 is considered overweight. A BMI of 30 or higher is considered obese. If your BMI is in the normal range, it means that you have a lower risk for weight-related health problems. If your BMI is in the overweight or obese range, you may be at increased risk for weight-related health problems, such as high blood pressure, heart disease, stroke, arthritis or joint pain, and diabetes. If your BMI is in the underweight range, you may be at increased risk for health problems such as fatigue, lower protection (immunity) against illness, muscle loss, bone loss, hair loss, and hormone problems. BMI is just one measure of your risk for weight-related health problems. You may be at higher risk for health problems if you are not active, you eat an unhealthy diet, or you drink too much alcohol or use tobacco products. Follow-up care is a key part of your treatment and safety.  Be sure to make and go to all appointments, and call your doctor if you are having problems. It's also a good idea to know your test results and keep a list of the medicines you take. How can you care for yourself at home? · Practice healthy eating habits. This includes eating plenty of fruits, vegetables, whole grains, lean protein, and low-fat dairy. · If your doctor recommends it, get more exercise. Walking is a good choice. Bit by bit, increase the amount you walk every day. Try for at least 30 minutes on most days of the week. · Do not smoke. Smoking can increase your risk for health problems. If you need help quitting, talk to your doctor about stop-smoking programs and medicines. These can increase your chances of quitting for good. · Limit alcohol to 2 drinks a day for men and 1 drink a day for women. Too much alcohol can cause health problems. If you have a BMI higher than 25 · Your doctor may do other tests to check your risk for weight-related health problems. This may include measuring the distance around your waist. A waist measurement of more than 40 inches in men or 35 inches in women can increase the risk of weight-related health problems. · Talk with your doctor about steps you can take to stay healthy or improve your health. You may need to make lifestyle changes to lose weight and stay healthy, such as changing your diet and getting regular exercise. If you have a BMI lower than 18.5 · Your doctor may do other tests to check your risk for health problems. · Talk with your doctor about steps you can take to stay healthy or improve your health. You may need to make lifestyle changes to gain or maintain weight and stay healthy, such as getting more healthy foods in your diet and doing exercises to build muscle. Where can you learn more? Go to http://kings-tavon.info/. Enter S176 in the search box to learn more about \"Body Mass Index: Care Instructions. \" 
 Current as of: October 13, 2016 Content Version: 11.4 © 5117-5677 Healthwise, HealthFusion. Care instructions adapted under license by [employer] (which disclaims liability or warranty for this information). If you have questions about a medical condition or this instruction, always ask your healthcare professional. [address] any warranty or liability for your use of this information. Introducing [facility]! [employer] introduces Intivix patient portal. Now you can access parts of your medical record, email your doctor's office, and request medication refills online. 1. In your internet browser, go to [email or web address] 2. Click on the First Time User? Click Here link in the Sign In box. You will see the New Member Sign Up page. 3. Enter your Intivix Access Code exactly as it appears below. You will not need to use this code after youve completed the sign-up process. If you do not sign up before the expiration date, you must request a new code. · Intivix Access Code: [email or web address] Expires: 6/4/2018  8:06 PM 
 
4. Enter the last four digits of your Social Security Number (xxxx) and Date of Birth (mm/dd/yyyy) as indicated and click Submit. You will be taken to the next sign-up page. 5. Create a Intivix ID. This will be your Intivix login ID and cannot be changed, so think of one that is secure and easy to remember. 6. Create a Intivix password. You can change your password at any time. 7. Enter your Password Reset Question and Answer. This can be used at a later time if you forget your password. 8. Enter your e-mail address. You will receive e-mail notification when new information is available in [address] 9. Click Sign Up. You can now view and download portions of your medical record. 10. Click the Download Summary menu link to download a portable copy of your medical information. If you have questions, please visit the Frequently Asked Questions section of the ponUpt website. Remember, [employer] is NOT to be used for urgent needs. For medical emergencies, dial 911. Now available from your iPhone and Android! Please provide this summary of care documentation to your next provider. Your primary care clinician is listed as Brittany Castle. If you have any questions after today's visit, please call [phone].

## 2018-04-25 NOTE — PROGRESS NOTES
Name and  verified        Chief Complaint   Patient presents with    Anxiety     patient tearful due to  of (7) unfaithful to marriage         Health Maintenance reviewed-discussed with patient.

## 2018-04-25 NOTE — PATIENT INSTRUCTIONS
Anxiety Disorder: Care Instructions  Your Care Instructions    Anxiety is a normal reaction to stress. Difficult situations can cause you to have symptoms such as sweaty palms and a nervous feeling. In an anxiety disorder, the symptoms are far more severe. Constant worry, muscle tension, trouble sleeping, nausea and diarrhea, and other symptoms can make normal daily activities difficult or impossible. These symptoms may occur for no reason, and they can affect your work, school, or social life. Medicines, counseling, and self-care can all help. Follow-up care is a key part of your treatment and safety. Be sure to make and go to all appointments, and call your doctor if you are having problems. It's also a good idea to know your test results and keep a list of the medicines you take. How can you care for yourself at home? · Take medicines exactly as directed. Call your doctor if you think you are having a problem with your medicine. · Go to your counseling sessions and follow-up appointments. · Recognize and accept your anxiety. Then, when you are in a situation that makes you anxious, say to yourself, \"This is not an emergency. I feel uncomfortable, but I am not in danger. I can keep going even if I feel anxious. \"  · Be kind to your body:  ¨ Relieve tension with exercise or a massage. ¨ Get enough rest.  ¨ Avoid alcohol, caffeine, nicotine, and illegal drugs. They can increase your anxiety level and cause sleep problems. ¨ Learn and do relaxation techniques. See below for more about these techniques. · Engage your mind. Get out and do something you enjoy. Go to a funny movie, or take a walk or hike. Plan your day. Having too much or too little to do can make you anxious. · Keep a record of your symptoms. Discuss your fears with a good friend or family member, or join a support group for people with similar problems. Talking to others sometimes relieves stress.   · Get involved in social groups, or volunteer to help others. Being alone sometimes makes things seem worse than they are. · Get at least 30 minutes of exercise on most days of the week to relieve stress. Walking is a good choice. You also may want to do other activities, such as running, swimming, cycling, or playing tennis or team sports. Relaxation techniques  Do relaxation exercises 10 to 20 minutes a day. You can play soothing, relaxing music while you do them, if you wish. · Tell others in your house that you are going to do your relaxation exercises. Ask them not to disturb you. · Find a comfortable place, away from all distractions and noise. · Lie down on your back, or sit with your back straight. · Focus on your breathing. Make it slow and steady. · Breathe in through your nose. Breathe out through either your nose or mouth. · Breathe deeply, filling up the area between your navel and your rib cage. Breathe so that your belly goes up and down. · Do not hold your breath. · Breathe like this for 5 to 10 minutes. Notice the feeling of calmness throughout your whole body. As you continue to breathe slowly and deeply, relax by doing the following for another 5 to 10 minutes:  · Tighten and relax each muscle group in your body. You can begin at your toes and work your way up to your head. · Imagine your muscle groups relaxing and becoming heavy. · Empty your mind of all thoughts. · Let yourself relax more and more deeply. · Become aware of the state of calmness that surrounds you. · When your relaxation time is over, you can bring yourself back to alertness by moving your fingers and toes and then your hands and feet and then stretching and moving your entire body. Sometimes people fall asleep during relaxation, but they usually wake up shortly afterward. · Always give yourself time to return to full alertness before you drive a car or do anything that might cause an accident if you are not fully alert.  Never play a relaxation tape while you drive a car. When should you call for help? Call 911 anytime you think you may need emergency care. For example, call if:  ? · You feel you cannot stop from hurting yourself or someone else. ? Keep the numbers for these national suicide hotlines: 4-068-487-TALK (5-492.538.6677) and 6-515-VICQHQG (5-987.318.8671). If you or someone you know talks about suicide or feeling hopeless, get help right away. ? Watch closely for changes in your health, and be sure to contact your doctor if:  ? · You have anxiety or fear that affects your life. ? · You have symptoms of anxiety that are new or different from those you had before. Where can you learn more? Go to http://kings-tavon.info/. Enter P754 in the search box to learn more about \"Anxiety Disorder: Care Instructions. \"  Current as of: May 12, 2017  Content Version: 11.4  © 1780-0766 ClearStar. Care instructions adapted under license by [employer] (which disclaims liability or warranty for this information). If you have questions about a medical condition or this instruction, always ask your healthcare professional. [address] any warranty or liability for your use of this information. Body Mass Index: Care Instructions  Your Care Instructions    Body mass index (BMI) can help you see if your weight is raising your risk for health problems. It uses a formula to compare how much you weigh with how tall you are. · A BMI lower than 18.5 is considered underweight. · A BMI between 18.5 and 24.9 is considered healthy. · A BMI between 25 and 29.9 is considered overweight. A BMI of 30 or higher is considered obese. If your BMI is in the normal range, it means that you have a lower risk for weight-related health problems.  If your BMI is in the overweight or obese range, you may be at increased risk for weight-related health problems, such as high blood pressure, heart disease, stroke, arthritis or joint pain, and diabetes. If your BMI is in the underweight range, you may be at increased risk for health problems such as fatigue, lower protection (immunity) against illness, muscle loss, bone loss, hair loss, and hormone problems. BMI is just one measure of your risk for weight-related health problems. You may be at higher risk for health problems if you are not active, you eat an unhealthy diet, or you drink too much alcohol or use tobacco products. Follow-up care is a key part of your treatment and safety. Be sure to make and go to all appointments, and call your doctor if you are having problems. It's also a good idea to know your test results and keep a list of the medicines you take. How can you care for yourself at home? · Practice healthy eating habits. This includes eating plenty of fruits, vegetables, whole grains, lean protein, and low-fat dairy. · If your doctor recommends it, get more exercise. Walking is a good choice. Bit by bit, increase the amount you walk every day. Try for at least 30 minutes on most days of the week. · Do not smoke. Smoking can increase your risk for health problems. If you need help quitting, talk to your doctor about stop-smoking programs and medicines. These can increase your chances of quitting for good. · Limit alcohol to 2 drinks a day for men and 1 drink a day for women. Too much alcohol can cause health problems. If you have a BMI higher than 25  · Your doctor may do other tests to check your risk for weight-related health problems. This may include measuring the distance around your waist. A waist measurement of more than 40 inches in men or 35 inches in women can increase the risk of weight-related health problems. · Talk with your doctor about steps you can take to stay healthy or improve your health.  You may need to make lifestyle changes to lose weight and stay healthy, such as changing your diet and getting regular exercise. If you have a BMI lower than 18.5  · Your doctor may do other tests to check your risk for health problems. · Talk with your doctor about steps you can take to stay healthy or improve your health. You may need to make lifestyle changes to gain or maintain weight and stay healthy, such as getting more healthy foods in your diet and doing exercises to build muscle. Where can you learn more? Go to http://kings-tavon.info/. Enter S176 in the search box to learn more about \"Body Mass Index: Care Instructions. \"  Current as of: October 13, 2016  Content Version: 11.4  © 0192-9224 [employer]. Care instructions adapted under license by [employer] (which disclaims liability or warranty for this information). If you have questions about a medical condition or this instruction, always ask your healthcare professional. [address] any warranty or liability for your use of this information.

## 2018-04-25 NOTE — PROGRESS NOTES
HISTORY OF PRESENT ILLNESS  oCdy Haley is a 50 y.o. female. HPI   Depression with Anxiety state, with uncontrolled bp in addition to have loose bm, started in jan of 2018 when she found out that her  of 15yrs has been have an affair with another women he leaves the house in 4am and does not come home till 1-2 am, she seen a love letters from other lady, has been having diarrhea since then palpitation and elevated bp currently on bb once daily and xanax 1-2 x per wk prn but recently has been taking it nightly often to get a good night of sleep, stating that she doesnot like her daily panic attacks at all at this time, and willing to be medicated for a better outcom    Patient state that thins are not getting better: pt states that  unable to sleep, , unable to concentrate at work and at home at this time, +feeling anxius, no guilty feeling, has nl interest to do things, not depressed, abnl appetite,  No Hoplessness, not wanted to heart self or others, no weight gain or loss, and is not related to social or perfomramce issues,     In addition pt states that there is no etoh or illicit drug use, not the first time,  no hx of physical nor of mental abuse, and currently is feeling safe in general.  Marriage of 16 yrs, sharing a mortgage of her house together, both owns the house, he is never home,  does not want to see counselor for resolution, working for [employer] at this time, r,       Current Outpatient Prescriptions   Medication Sig Dispense Refill    metoprolol succinate (TOPROL-XL) 25 mg XL tablet Take 1 Tab by mouth daily. 90 Tab 3    ALPRAZolam (XANAX) 0.5 mg tablet Take 1 Tab by mouth two (2) times daily as needed for Anxiety or Sleep.  Max Daily Amount: 1 mg. 60 Tab 1    lactase (LACTOSE FAST ACTING) 9,000 unit chew Please take 1 tablet 15-20 minutes before taking any dairy product  Indications: LACTOSE INTOLERANCE 90 Tab 3     No Known Allergies  Past Medical History:   Diagnosis Date    Anxiety disorder 4/25/2018    Cervical pain (neck) 1/27/2015    Elevated BP 6/6/2016    Elevated heart rate and blood pressure 6/30/2016    HTN, goal below 140/90 9/21/2017    Panic attack as reaction to stress 6/30/2016    Panic disorder with agoraphobia 6/30/2016    Right shoulder pain 1/27/2015    Thyroid nodule      Past Surgical History:   Procedure Laterality Date    HX GYN      HX HYSTERECTOMY  2012    THYROIDECTOMY  2002     Family History   Problem Relation Age of Onset    Hypertension Mother     Hypertension Sister      Social History   Substance Use Topics    Smoking status: Never Smoker    Smokeless tobacco: Never Used    Alcohol use No      Lab Results  Component Value Date/Time   WBC 4.7 09/21/2017 01:52 PM   HGB 13.6 09/21/2017 01:52 PM   HCT 40.1 09/21/2017 01:52 PM   PLATELET 833 78/84/0507 01:52 PM   MCV 88 09/21/2017 01:52 PM     Lab Results  Component Value Date/Time   Glucose 81 09/21/2017 01:52 PM   LDL, calculated 116 (H) 03/30/2011 04:19 PM   Creatinine 0.87 09/21/2017 01:52 PM         Review of Systems   Constitutional: Negative for chills and fever. HENT: Negative for congestion and nosebleeds. Eyes: Negative for blurred vision and pain. Respiratory: Negative for cough, shortness of breath and wheezing. Cardiovascular: Negative for chest pain and leg swelling. Gastrointestinal: Negative for constipation, diarrhea, nausea and vomiting. Genitourinary: Negative for dysuria and frequency. Musculoskeletal: Negative for joint pain and myalgias. Skin: Negative for itching and rash. Neurological: Negative for dizziness, loss of consciousness and headaches. Psychiatric/Behavioral: Negative for depression. The patient is not nervous/anxious and does not have insomnia. Physical Exam   Constitutional: She is oriented to person, place, and time. She appears well-developed and well-nourished. HENT:   Head: Normocephalic and atraumatic.    Eyes: Conjunctivae and EOM are normal. Pupils are equal, round, and reactive to light. Neck: No JVD present. No thyromegaly present. Cardiovascular: Normal rate, regular rhythm, normal heart sounds and intact distal pulses. Exam reveals no gallop and no friction rub. No murmur heard. Pulmonary/Chest: Effort normal and breath sounds normal. No stridor. No respiratory distress. She has no wheezes. She has no rales. Abdominal: Soft. Bowel sounds are normal. She exhibits no distension and no mass. There is no tenderness. Musculoskeletal: Normal range of motion. She exhibits no edema or tenderness. Lymphadenopathy:     She has no cervical adenopathy. Neurological: She is alert and oriented to person, place, and time. She has normal reflexes. No cranial nerve deficit. Skin: No rash noted. No erythema. Psychiatric: She has a normal mood and affect. Her behavior is normal.   Nursing note and vitals reviewed. ASSESSMENT and PLAN  Diagnoses and all orders for this visit:    1. Panic attack as reaction to stress  -     ALPRAZolam (XANAX) 0.5 mg tablet; Take 1 Tab by mouth nightly as needed for Anxiety or Sleep. Max Daily Amount: 0.5 mg.  -     metoprolol succinate (TOPROL-XL) 50 mg XL tablet; Take 1 Tab by mouth daily. -     buPROPion XL (WELLBUTRIN XL) 150 mg tablet; Take 1 Tab by mouth every morning. 2. Anxiety disorder due to known physiological condition  -     ALPRAZolam (XANAX) 0.5 mg tablet; Take 1 Tab by mouth nightly as needed for Anxiety or Sleep. Max Daily Amount: 0.5 mg.  -     metoprolol succinate (TOPROL-XL) 50 mg XL tablet; Take 1 Tab by mouth daily. -     buPROPion XL (WELLBUTRIN XL) 150 mg tablet; Take 1 Tab by mouth every morning. 3. Panic disorder with agoraphobia  -     ALPRAZolam (XANAX) 0.5 mg tablet; Take 1 Tab by mouth nightly as needed for Anxiety or Sleep. Max Daily Amount: 0.5 mg.  -     metoprolol succinate (TOPROL-XL) 50 mg XL tablet; Take 1 Tab by mouth daily.   -     buPROPion XL (WELLBUTRIN XL) 150 mg tablet; Take 1 Tab by mouth every morning. 4. HTN, goal below 140/90  -     ALPRAZolam (XANAX) 0.5 mg tablet; Take 1 Tab by mouth nightly as needed for Anxiety or Sleep. Max Daily Amount: 0.5 mg.  -     metoprolol succinate (TOPROL-XL) 50 mg XL tablet; Take 1 Tab by mouth daily. -     buPROPion XL (WELLBUTRIN XL) 150 mg tablet; Take 1 Tab by mouth every morning. 5. Diarrhea due to malabsorption  -     ALPRAZolam (XANAX) 0.5 mg tablet; Take 1 Tab by mouth nightly as needed for Anxiety or Sleep. Max Daily Amount: 0.5 mg.  -     metoprolol succinate (TOPROL-XL) 50 mg XL tablet; Take 1 Tab by mouth daily. -     buPROPion XL (WELLBUTRIN XL) 150 mg tablet; Take 1 Tab by mouth every morning.       Depression with anxiety in a stable condition, not suicidal, start antianxiety and calming medication for now will reevaluate in 4 weeks for progression or the side effects of the medication, in addition Counseling , social support, spiritual belonging, inc physical activity, all offered,  compliance with meds advised, was told to call for any concern

## 2018-05-23 ENCOUNTER — OFFICE VISIT (OUTPATIENT)
Dept: FAMILY MEDICINE CLINIC | Age: 53
End: 2018-05-23

## 2018-05-23 VITALS
TEMPERATURE: 97.5 F | RESPIRATION RATE: 16 BRPM | HEIGHT: 62 IN | WEIGHT: 148.8 LBS | DIASTOLIC BLOOD PRESSURE: 86 MMHG | SYSTOLIC BLOOD PRESSURE: 148 MMHG | HEART RATE: 69 BPM | BODY MASS INDEX: 27.38 KG/M2

## 2018-05-23 DIAGNOSIS — F43.0 PANIC ATTACK AS REACTION TO STRESS: ICD-10-CM

## 2018-05-23 DIAGNOSIS — K90.9 DIARRHEA DUE TO MALABSORPTION: ICD-10-CM

## 2018-05-23 DIAGNOSIS — Z12.39 SCREENING FOR BREAST CANCER: ICD-10-CM

## 2018-05-23 DIAGNOSIS — R19.7 DIARRHEA DUE TO MALABSORPTION: ICD-10-CM

## 2018-05-23 DIAGNOSIS — I10 HTN, GOAL BELOW 140/90: ICD-10-CM

## 2018-05-23 DIAGNOSIS — F41.0 PANIC ATTACK AS REACTION TO STRESS: ICD-10-CM

## 2018-05-23 DIAGNOSIS — F06.4 ANXIETY DISORDER DUE TO KNOWN PHYSIOLOGICAL CONDITION: Primary | ICD-10-CM

## 2018-05-23 DIAGNOSIS — Z12.11 SCREEN FOR COLON CANCER: ICD-10-CM

## 2018-05-23 DIAGNOSIS — F40.01 PANIC DISORDER WITH AGORAPHOBIA: ICD-10-CM

## 2018-05-23 RX ORDER — METOPROLOL SUCCINATE 50 MG/1
50 TABLET, EXTENDED RELEASE ORAL
Qty: 90 TAB | Refills: 4 | Status: SHIPPED | OUTPATIENT
Start: 2018-07-23 | End: 2020-10-26

## 2018-05-23 RX ORDER — BUPROPION HYDROCHLORIDE 150 MG/1
150 TABLET ORAL
Qty: 30 TAB | Refills: 6 | Status: SHIPPED | OUTPATIENT
Start: 2018-05-23 | End: 2018-06-15 | Stop reason: SDUPTHER

## 2018-05-23 NOTE — MR AVS SNAPSHOT
[address] [facility] [address] [id number] 
[phone] Patient: Nora Andrade MRN: [id number] :1965 Visit Information Date & Time Provider Department Dept. Phone Encounter #  
 2018  9:45 AM Jean Mckeon MD [facility] [phone] [id number] Follow-up Instructions Return in about 4 months (around 2018), or if symptoms worsen or fail to improve. Follow-up and Disposition History Upcoming Health Maintenance Date Due  
 PAP AKA CERVICAL CYTOLOGY 3/30/2014 BREAST CANCER SCRN MAMMOGRAM 2017 FOBT Q 1 YEAR AGE 50-75 2017 Influenza Age 5 to Adult 2018 DTaP/Tdap/Td series (2 - Td) 3/30/2021 Allergies as of 2018  Review Complete On: 2018 By: Mary Lou Garcia LPN No Known Allergies Current Immunizations  Reviewed on 2017 Name Date TDAP Vaccine 3/30/2011 Not reviewed this visit You Were Diagnosed With   
  
 Codes Comments Anxiety disorder due to known physiological condition    -  Primary ICD-10-CM: F06.4 ICD-9-CM: 300.00 Screening for breast cancer     ICD-10-CM: Z12.31 
ICD-9-CM: V76.10 Screen for colon cancer     ICD-10-CM: Z12.11 ICD-9-CM: V76.51 Panic attack as reaction to stress     ICD-10-CM: F41.0, F43.0 ICD-9-CM: 308.0 Panic disorder with agoraphobia     ICD-10-CM: F40.01 
ICD-9-CM: 300.21   
 HTN, goal below 140/90     ICD-10-CM: I10 
ICD-9-CM: 401.9 Diarrhea due to malabsorption     ICD-10-CM: K90.9, R19.7 ICD-9-CM: 579.9, 787.91 Vitals BP Pulse Temp Resp Height(growth percentile) Weight(growth percentile) 159/85 (BP 1 Location: Left arm, BP Patient Position: At rest) 69 97.5 °F (36.4 °C) (Oral) 16 5' 2\" (1.575 m) 148 lb 12.8 oz (67.5 kg) BMI OB Status Smoking Status 27.22 kg/m2 Hysterectomy Never Smoker Vitals History BMI and BSA Data Body Mass Index Body Surface Area  
 27.22 kg/m 2 1.72 m 2 Preferred Pharmacy Pharmacy Name Phone [id number] Alejandra Fajardo [facility] [phone] Your Updated Medication List  
  
   
This list is accurate as of 5/23/18 11:02 AM.  Always use your most recent med list.  
  
  
  
  
 ALPRAZolam 0.5 mg tablet Commonly known as:  Mercedes  Take 1 Tab by mouth nightly as needed for Anxiety or Sleep. Max Daily Amount: 0.5 mg.  
  
 buPROPion  mg tablet Commonly known as:  Brenden Mon Take 1 Tab by mouth every morning. lactase 9,000 unit Chew Commonly known as:  Lactose Fast Acting Please take 1 tablet 15-20 minutes before taking any dairy product  Indications: LACTOSE INTOLERANCE  
  
 metoprolol succinate 50 mg XL tablet Commonly known as:  TOPROL-XL Take 1 Tab by mouth nightly. Start taking on:  7/23/2018 Prescriptions Printed Refills  
 metoprolol succinate (TOPROL-XL) 50 mg XL tablet 4 Starting on: 7/23/2018 Sig: Take 1 Tab by mouth nightly. Class: Print Route: Oral  
  
Prescriptions Sent to Pharmacy Refills buPROPion XL (WELLBUTRIN XL) 150 mg tablet 6 Sig: Take 1 Tab by mouth every morning. Class: Normal  
 Pharmacy: [address] [place] [address] #: [phone] Route: Oral  
  
We Performed the Following OCCULT BLOOD, IMMUNOASSAY (FIT) [id number] CPT(R)] Follow-up Instructions Return in about 4 months (around 9/23/2018), or if symptoms worsen or fail to improve. To-Do List   
 05/23/2018 Imaging:  [facility] MAMMO BI SCREENING INCL CAD Patient Instructions Learning About Anxiety Disorders What are anxiety disorders? Anxiety disorders are a type of medical problem. They cause severe anxiety. When you feel anxious, you feel that something bad is about to happen. This feeling interferes with your life. These disorders include: · Generalized anxiety disorder. You feel worried and stressed about many everyday events and activities. This goes on for several months and disrupts your life on most days. · Panic disorder. You have repeated panic attacks. A panic attack is a sudden, intense fear or anxiety. It may make you feel short of breath. Your heart may pound. · Social anxiety disorder. You feel very anxious about what you will say or do in front of people. For example, you may be scared to talk or eat in public. This problem affects your daily life. · Phobias. You are very scared of a specific object, situation, or activity. For example, you may fear spiders, high places, or small spaces. What are the symptoms? Generalized anxiety disorder Symptoms may include: · Feeling worried and stressed about many things almost every day. · Feeling tired or irritable. You may have a hard time concentrating. · Having headaches or muscle aches. · Having a hard time getting to sleep or staying asleep. Panic disorder You may have repeated panic attacks when there is no reason for feeling afraid. You may change your daily activities because you worry that you will have another attack. Symptoms may include: 
· Intense fear, terror, or anxiety. · Trouble breathing or very fast breathing. · Chest pain or tightness. · A heartbeat that races or is not regular. Social anxiety disorder Symptoms may include: · Fear about a social situation, such as eating in front of others or speaking in public. You may worry a lot. Or you may be afraid that something bad will happen. · Anxiety that can cause you to blush, sweat, and feel shaky. · A heartbeat that is faster than normal. 
· A hard time focusing. Phobias Symptoms may include: · More fear than most people of being around an object, being in a situation, or doing an activity. You might also be stressed about the chance of being around the thing you fear. · Worry about losing control, panicking, fainting, or having physical symptoms like a faster heartbeat when you are around the situation or object. How are these disorders treated? Anxiety disorders can be treated with medicines or counseling. A combination of both may be used. Medicines may include: · Antidepressants. These may help your symptoms by keeping chemicals in your brain in balance. · Benzodiazepines. These may give you short-term relief of your symptoms. Some people use cognitive-behavioral therapy. A therapist helps you learn to change stressful or bad thoughts into helpful thoughts. Lead a healthy lifestyle A healthy lifestyle may help you feel better. · Get at least 30 minutes of exercise on most days of the week. Walking is a good choice. · Eat a healthy diet. Include fruits, vegetables, lean proteins, and whole grains in your diet each day. · Try to go to bed at the same time every night. Try for 8 hours of sleep a night. · Find ways to manage stress. Try relaxation exercises. · Avoid alcohol and illegal drugs. Follow-up care is a key part of your treatment and safety. Be sure to make and go to all appointments, and call your doctor if you are having problems. It's also a good idea to know your test results and keep a list of the medicines you take. Where can you learn more? Go to http://kings-tavon.info/. Enter V732 in the search box to learn more about \"Learning About Anxiety Disorders. \" Current as of: May 12, 2017 Content Version: 11.4 © 6771-7492 [employer]. Care instructions adapted under license by [employer] (which disclaims liability or warranty for this information). If you have questions about a medical condition or this instruction, always ask your healthcare professional. [place] [address] any warranty or liability for your use of this information. Plantar Fasciitis: Care Instructions Your Care Instructions Plantar fasciitis is pain and inflammation of the plantar fascia, the tissue at the bottom of your foot that connects the heel bone to the toes. The plantar fascia also supports the arch. If you strain the plantar fascia, it can develop small tears and cause heel pain when you stand or walk. Plantar fasciitis can be caused by running or other sports. It also may occur in people who are overweight or who have high arches or flat feet. You may get plantar fasciitis if you walk or stand for long periods, or have a tight Achilles tendon or calf muscles. You can improve your foot pain with rest and other care at home. It might take a few weeks to a few months for your foot to heal completely. Follow-up care is a key part of your treatment and safety. Be sure to make and go to all appointments, and call your doctor if you are having problems. It's also a good idea to know your test results and keep a list of the medicines you take. How can you care for yourself at home? · Rest your feet often. Reduce your activity to a level that lets you avoid pain. If possible, do not run or walk on hard surfaces. · Take pain medicines exactly as directed. ¨ If the doctor gave you a prescription medicine for pain, take it as prescribed. ¨ If you are not taking a prescription pain medicine, take an over-the-counter anti-inflammatory medicine for pain and swelling, such as ibuprofen (Advil, Motrin) or naproxen (Aleve). Read and follow all instructions on the label. · Use ice massage to help with pain and swelling. You can use an ice cube or an ice cup several times a day. To make an ice cup, fill a paper cup with water and freeze it. Cut off the top of the cup until a half-inch of ice shows. Hold onto the remaining paper to use the cup. Rub the ice in small circles over the area for 5 to 7 minutes.  
· Contrast baths, which alternate hot and cold water, can also help reduce swelling. But because heat alone may make pain and swelling worse, end a contrast bath with a soak in cold water. · Wear a night splint if your doctor suggests it. A night splint holds your foot with the toes pointed up and the foot and ankle at a 90-degree angle. This position gives the bottom of your foot a constant, gentle stretch. · Do simple exercises such as calf stretches and towel stretches 2 to 3 times each day, especially when you first get up in the morning. These can help the plantar fascia become more flexible. They also make the muscles that support your arch stronger. Hold these stretches for 15 to 30 seconds per stretch. Repeat 2 to 4 times. ¨ Stand about 1 foot from a wall. Place the palms of both hands against the wall at chest level. Lean forward against the wall, keeping one leg with the knee straight and heel on the ground while bending the knee of the other leg. ¨ Sit down on the floor or a mat with your feet stretched in front of you. Roll up a towel lengthwise, and loop it over the ball of your foot. Holding the towel at both ends, gently pull the towel toward you to stretch your foot. · Wear shoes with good arch support. Athletic shoes or shoes with a well-cushioned sole are good choices. · Try heel cups or shoe inserts (orthotics) to help cushion your heel. You can buy these at many shoe stores. · Put on your shoes as soon as you get out of bed. Going barefoot or wearing slippers may make your pain worse. · Reach and stay at a good weight for your height. This puts less strain on your feet. When should you call for help? Call your doctor now or seek immediate medical care if: 
· You have heel pain with fever, redness, or warmth in your heel. · You cannot put weight on the sore foot. Watch closely for changes in your health, and be sure to contact your doctor if: 
· You have numbness or tingling in your heel. · Your heel pain lasts more than 2 weeks. Where can you learn more? Go to http://kings-tavon.info/. Virginia Haynes in the search box to learn more about \"Plantar Fasciitis: Care Instructions. \" Current as of: March 21, 2017 Content Version: 11.4 © 4087-5327 [employer]rix. Care instructions adapted under license by [employer] (which disclaims liability or warranty for this information). If you have questions about a medical condition or this instruction, always ask your healthcare professional. [address] any warranty or liability for your use of this information. Plantar Fasciitis: Exercises Your Care Instructions Here are some examples of typical rehabilitation exercises for your condition. Start each exercise slowly. Ease off the exercise if you start to have pain. Your doctor or physical therapist will tell you when you can start these exercises and which ones will work best for you. How to do the exercises Towel stretch 1. Sit with your legs extended and knees straight. 2. Place a towel around your foot just under the toes. 3. Hold each end of the towel in each hand, with your hands above your knees. 4. Pull back with the towel so that your foot stretches toward you. 5. Hold the position for at least 15 to 30 seconds. 6. Repeat 2 to 4 times a session, up to 5 sessions a day. Calf stretch This exercise stretches the muscles at the back of the lower leg (the calf) and the Achilles tendon. Do this exercise 3 or 4 times a day, 5 days a week. 1. Stand facing a wall with your hands on the wall at about eye level. Put the leg you want to stretch about a step behind your other leg. 2. Keeping your back heel on the floor, bend your front knee until you feel a stretch in the back leg. 3. Hold the stretch for 15 to 30 seconds. Repeat 2 to 4 times. Plantar fascia and calf stretch Stretching the plantar fascia and calf muscles can increase flexibility and decrease heel pain. You can do this exercise several times each day and before and after activity. 1. Stand on a step as shown above. Be sure to hold on to the banister. 2. Slowly let your heels down over the edge of the step as you relax your calf muscles. You should feel a gentle stretch across the bottom of your foot and up the back of your leg to your knee. 3. Hold the stretch about 15 to 30 seconds, and then tighten your calf muscle a little to bring your heel back up to the level of the step. Repeat 2 to 4 times. Towel curls Make this exercise more challenging by placing a weighted object, such as a soup can, on the other end of the towel. 1. While sitting, place your foot on a towel on the floor and scrunch the towel toward you with your toes. 2. Then, also using your toes, push the towel away from you. Hope pickups 1. Put marbles on the floor next to a cup. 
2. Using your toes, try to lift the marbles up from the floor and put them in the cup. Follow-up care is a key part of your treatment and safety. Be sure to make and go to all appointments, and call your doctor if you are having problems. It's also a good idea to know your test results and keep a list of the medicines you take. Where can you learn more? Go to http://kings-tavon.info/. Malgorzata Zaman in the search box to learn more about \"Plantar Fasciitis: Exercises. \" Current as of: March 21, 2017 Content Version: 11.4 © 4810-4874 [employer]. Care instructions adapted under license by [employer] (which disclaims liability or warranty for this information). If you have questions about a medical condition or this instruction, always ask your healthcare professional. [place] [address] any warranty or liability for your use of this information. Patient Instructions History Introducing [facility]! [employer] introduces TISSUELAB patient portal. Now you can access parts of your medical record, email your doctor's office, and request medication refills online. 1. In your internet browser, go to [email or web address] 2. Click on the First Time User? Click Here link in the Sign In box. You will see the New Member Sign Up page. 3. Enter your TISSUELAB Access Code exactly as it appears below. You will not need to use this code after youve completed the sign-up process. If you do not sign up before the expiration date, you must request a new code. · TISSUELAB Access Code: [email or web address] Expires: 6/4/2018  8:06 PM 
 
4. Enter the last four digits of your Social Security Number (xxxx) and Date of Birth (mm/dd/yyyy) as indicated and click Submit. You will be taken to the next sign-up page. 5. Create a TISSUELAB ID. This will be your TISSUELAB login ID and cannot be changed, so think of one that is secure and easy to remember. 6. Create a TISSUELAB password. You can change your password at any time. 7. Enter your Password Reset Question and Answer. This can be used at a later time if you forget your password. 8. Enter your e-mail address. You will receive e-mail notification when new information is available in [address] 9. Click Sign Up. You can now view and download portions of your medical record. 10. Click the Download Summary menu link to download a portable copy of your medical information. If you have questions, please visit the Frequently Asked Questions section of the TISSUELAB website. Remember, TISSUELAB is NOT to be used for urgent needs. For medical emergencies, dial 911. Now available from your iPhone and Android! Please provide this summary of care documentation to your next provider. Your primary care clinician is listed as Tevin Rushing. If you have any questions after today's visit, please call [phone].

## 2018-05-23 NOTE — LETTER
NOTIFICATION RETURN TO WORK / SCHOOL 
 
5/23/2018 10:46 AM 
 
Ms. Ruy Lebron [address] [place] [address] [address] [id number] To Whom It May Concern: 
 
Ruy Lebron is currently under the care of  [facility]. She will return to work/school on: 5/24/2018 If there are questions or concerns please have the patient contact our office.  
 
 
 
Sincerely, 
 
 
Sonia Allen MD

## 2018-05-23 NOTE — PROGRESS NOTES
Name and  verified        Chief Complaint   Patient presents with    Anxiety     f/u         Health Maintenance reviewed-discussed with patient. 1. Have you been to the ER, urgent care clinic since your last visit? Hospitalized since your last visit? No    2. Have you seen or consulted any other health care providers outside of the [address] [place] [address] since your last visit? Include any pap smears or colon screening. No       Patient stated last PAP exam . Order placed for FOBT , per Verbal Order from Dr. Crow Angry on 2018 due to colon cancer screen.

## 2018-05-23 NOTE — PROGRESS NOTES
HISTORY OF PRESENT ILLNESS  Cristi Gutierrez is a 50 y.o. female. HPI     Depression with the anxiety and panic states of mind    nicley controlled with the current meds,  4 wks ago was givne 150mg of welbutrin has been compliant getting better not all the way, taking her benzo wkends and if she has trouble with sleeping, Patient state that it is getting better: pt states and reports of feeling less anxius, less guilty feeling,  less Hoplessness, less feeling of  Wanting to heart self or others, less trouble with weight gain or loss, less tendency of etoh or illicit drug use, more ability of sleep, more ablitiy  to concentrate at work( she is only able to work 8-12 hrs per week at this time) and at home with the current medications,and all together a safe feeling at home and at work        Current Outpatient Prescriptions   Medication Sig Dispense Refill    ALPRAZolam (XANAX) 0.5 mg tablet Take 1 Tab by mouth nightly as needed for Anxiety or Sleep. Max Daily Amount: 0.5 mg. 30 Tab 2    metoprolol succinate (TOPROL-XL) 50 mg XL tablet Take 1 Tab by mouth daily. 30 Tab 4    buPROPion XL (WELLBUTRIN XL) 150 mg tablet Take 1 Tab by mouth every morning.  30 Tab 0    lactase (LACTOSE FAST ACTING) 9,000 unit chew Please take 1 tablet 15-20 minutes before taking any dairy product  Indications: LACTOSE INTOLERANCE 90 Tab 3     No Known Allergies  Past Medical History:   Diagnosis Date    Anxiety disorder 4/25/2018    Cervical pain (neck) 1/27/2015    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    Elevated BP 6/6/2016    Elevated heart rate and blood pressure 6/30/2016    HTN, goal below 140/90 9/21/2017    Panic attack as reaction to stress 6/30/2016    Panic disorder with agoraphobia 6/30/2016    Right shoulder pain 1/27/2015    Thyroid nodule      Past Surgical History:   Procedure Laterality Date    HX GYN      HX HYSTERECTOMY  2012    THYROIDECTOMY  2002     Family History   Problem Relation Age of Onset    Hypertension Mother     Hypertension Sister      Social History   Substance Use Topics    Smoking status: Never Smoker    Smokeless tobacco: Never Used    Alcohol use No      Lab Results  Component Value Date/Time   WBC 4.7 09/21/2017 01:52 PM   HGB 13.6 09/21/2017 01:52 PM   HCT 40.1 09/21/2017 01:52 PM   PLATELET 723 99/06/5784 01:52 PM   MCV 88 09/21/2017 01:52 PM     Lab Results  Component Value Date/Time   Cholesterol, total 177 09/21/2017 01:52 PM   HDL Cholesterol 65 09/21/2017 01:52 PM   LDL, calculated 116 (H) 03/30/2011 04:19 PM   Triglyceride 46 03/30/2011 04:19 PM     Lab Results  Component Value Date/Time   TSH 1.560 09/21/2017 01:52 PM         Review of Systems   Constitutional: Negative for chills and fever. HENT: Negative for ear pain and nosebleeds. Eyes: Negative for blurred vision, pain and discharge. Respiratory: Negative for shortness of breath. Cardiovascular: Negative for chest pain and leg swelling. Gastrointestinal: Negative for constipation, diarrhea, nausea and vomiting. Genitourinary: Negative for frequency. Musculoskeletal: Negative for joint pain. Skin: Negative for itching and rash. Neurological: Negative for headaches. Psychiatric/Behavioral: Negative for depression. The patient is not nervous/anxious. Physical Exam   Constitutional: She is oriented to person, place, and time. She appears well-developed and well-nourished. HENT:   Head: Normocephalic and atraumatic. Eyes: Conjunctivae and EOM are normal. Pupils are equal, round, and reactive to light. Neck: No JVD present. No thyromegaly present. Cardiovascular: Normal rate, regular rhythm, normal heart sounds and intact distal pulses. Exam reveals no gallop and no friction rub. No murmur heard. Pulmonary/Chest: Effort normal and breath sounds normal. No stridor. No respiratory distress. She has no wheezes. She has no rales. Abdominal: Soft.  Bowel sounds are normal. She exhibits no distension and no mass. There is no tenderness. Musculoskeletal: Normal range of motion. She exhibits no edema or tenderness. Lymphadenopathy:     She has no cervical adenopathy. Neurological: She is alert and oriented to person, place, and time. She has normal reflexes. No cranial nerve deficit. Skin: No rash noted. No erythema. Psychiatric: She has a normal mood and affect. Her behavior is normal.   Nursing note and vitals reviewed. ASSESSMENT and PLAN  Diagnoses and all orders for this visit:    1. Anxiety disorder due to known physiological condition  -     buPROPion XL (WELLBUTRIN XL) 150 mg tablet; Take 1 Tab by mouth every morning.  -     metoprolol succinate (TOPROL-XL) 50 mg XL tablet; Take 1 Tab by mouth nightly. 2. Screening for breast cancer  -     [facility] MAMMO BI SCREENING INCL CAD; Future    3. Screen for colon cancer  -     OCCULT BLOOD, IMMUNOASSAY (FIT)    4. Panic attack as reaction to stress  -     buPROPion XL (WELLBUTRIN XL) 150 mg tablet; Take 1 Tab by mouth every morning.  -     metoprolol succinate (TOPROL-XL) 50 mg XL tablet; Take 1 Tab by mouth nightly. 5. Panic disorder with agoraphobia  -     buPROPion XL (WELLBUTRIN XL) 150 mg tablet; Take 1 Tab by mouth every morning.  -     metoprolol succinate (TOPROL-XL) 50 mg XL tablet; Take 1 Tab by mouth nightly. 6. HTN, goal below 140/90  -     buPROPion XL (WELLBUTRIN XL) 150 mg tablet; Take 1 Tab by mouth every morning.  -     metoprolol succinate (TOPROL-XL) 50 mg XL tablet; Take 1 Tab by mouth nightly. 7. Diarrhea due to malabsorption  -     buPROPion XL (WELLBUTRIN XL) 150 mg tablet; Take 1 Tab by mouth every morning.  -     metoprolol succinate (TOPROL-XL) 50 mg XL tablet; Take 1 Tab by mouth nightly.       Depression with anxiety in a stable condition, not suicidal, start antianxiety and calming medication for now will reevaluate in 4 m for progression or the side effects of the medication, in addition Counseling , social support, spiritual belonging, inc physical activity, all offered,  compliance with meds advised, was told to call for any concern

## 2018-05-23 NOTE — PATIENT INSTRUCTIONS
Learning About Anxiety Disorders  What are anxiety disorders? Anxiety disorders are a type of medical problem. They cause severe anxiety. When you feel anxious, you feel that something bad is about to happen. This feeling interferes with your life. These disorders include:  · Generalized anxiety disorder. You feel worried and stressed about many everyday events and activities. This goes on for several months and disrupts your life on most days. · Panic disorder. You have repeated panic attacks. A panic attack is a sudden, intense fear or anxiety. It may make you feel short of breath. Your heart may pound. · Social anxiety disorder. You feel very anxious about what you will say or do in front of people. For example, you may be scared to talk or eat in public. This problem affects your daily life. · Phobias. You are very scared of a specific object, situation, or activity. For example, you may fear spiders, high places, or small spaces. What are the symptoms? Generalized anxiety disorder  Symptoms may include:  · Feeling worried and stressed about many things almost every day. · Feeling tired or irritable. You may have a hard time concentrating. · Having headaches or muscle aches. · Having a hard time getting to sleep or staying asleep. Panic disorder  You may have repeated panic attacks when there is no reason for feeling afraid. You may change your daily activities because you worry that you will have another attack. Symptoms may include:  · Intense fear, terror, or anxiety. · Trouble breathing or very fast breathing. · Chest pain or tightness. · A heartbeat that races or is not regular. Social anxiety disorder  Symptoms may include:  · Fear about a social situation, such as eating in front of others or speaking in public. You may worry a lot. Or you may be afraid that something bad will happen. · Anxiety that can cause you to blush, sweat, and feel shaky.   · A heartbeat that is faster than normal.  · A hard time focusing. Phobias  Symptoms may include:  · More fear than most people of being around an object, being in a situation, or doing an activity. You might also be stressed about the chance of being around the thing you fear. · Worry about losing control, panicking, fainting, or having physical symptoms like a faster heartbeat when you are around the situation or object. How are these disorders treated? Anxiety disorders can be treated with medicines or counseling. A combination of both may be used. Medicines may include:  · Antidepressants. These may help your symptoms by keeping chemicals in your brain in balance. · Benzodiazepines. These may give you short-term relief of your symptoms. Some people use cognitive-behavioral therapy. A therapist helps you learn to change stressful or bad thoughts into helpful thoughts. Lead a healthy lifestyle  A healthy lifestyle may help you feel better. · Get at least 30 minutes of exercise on most days of the week. Walking is a good choice. · Eat a healthy diet. Include fruits, vegetables, lean proteins, and whole grains in your diet each day. · Try to go to bed at the same time every night. Try for 8 hours of sleep a night. · Find ways to manage stress. Try relaxation exercises. · Avoid alcohol and illegal drugs. Follow-up care is a key part of your treatment and safety. Be sure to make and go to all appointments, and call your doctor if you are having problems. It's also a good idea to know your test results and keep a list of the medicines you take. Where can you learn more? Go to http://kings-tavon.info/. Enter F925 in the search box to learn more about \"Learning About Anxiety Disorders. \"  Current as of: May 12, 2017  Content Version: 11.4  © 4761-7412 [employer]. Care instructions adapted under license by [employer] (which disclaims liability or warranty for this information).  If you have questions about a medical condition or this instruction, always ask your healthcare professional. [address] any warranty or liability for your use of this information. Plantar Fasciitis: Care Instructions  Your Care Instructions    Plantar fasciitis is pain and inflammation of the plantar fascia, the tissue at the bottom of your foot that connects the heel bone to the toes. The plantar fascia also supports the arch. If you strain the plantar fascia, it can develop small tears and cause heel pain when you stand or walk. Plantar fasciitis can be caused by running or other sports. It also may occur in people who are overweight or who have high arches or flat feet. You may get plantar fasciitis if you walk or stand for long periods, or have a tight Achilles tendon or calf muscles. You can improve your foot pain with rest and other care at home. It might take a few weeks to a few months for your foot to heal completely. Follow-up care is a key part of your treatment and safety. Be sure to make and go to all appointments, and call your doctor if you are having problems. It's also a good idea to know your test results and keep a list of the medicines you take. How can you care for yourself at home? · Rest your feet often. Reduce your activity to a level that lets you avoid pain. If possible, do not run or walk on hard surfaces. · Take pain medicines exactly as directed. ¨ If the doctor gave you a prescription medicine for pain, take it as prescribed. ¨ If you are not taking a prescription pain medicine, take an over-the-counter anti-inflammatory medicine for pain and swelling, such as ibuprofen (Advil, Motrin) or naproxen (Aleve). Read and follow all instructions on the label. · Use ice massage to help with pain and swelling. You can use an ice cube or an ice cup several times a day. To make an ice cup, fill a paper cup with water and freeze it.  Cut off the top of the cup until a half-inch of ice shows. Hold onto the remaining paper to use the cup. Rub the ice in small circles over the area for 5 to 7 minutes. · Contrast baths, which alternate hot and cold water, can also help reduce swelling. But because heat alone may make pain and swelling worse, end a contrast bath with a soak in cold water. · Wear a night splint if your doctor suggests it. A night splint holds your foot with the toes pointed up and the foot and ankle at a 90-degree angle. This position gives the bottom of your foot a constant, gentle stretch. · Do simple exercises such as calf stretches and towel stretches 2 to 3 times each day, especially when you first get up in the morning. These can help the plantar fascia become more flexible. They also make the muscles that support your arch stronger. Hold these stretches for 15 to 30 seconds per stretch. Repeat 2 to 4 times. ¨ Stand about 1 foot from a wall. Place the palms of both hands against the wall at chest level. Lean forward against the wall, keeping one leg with the knee straight and heel on the ground while bending the knee of the other leg. ¨ Sit down on the floor or a mat with your feet stretched in front of you. Roll up a towel lengthwise, and loop it over the ball of your foot. Holding the towel at both ends, gently pull the towel toward you to stretch your foot. · Wear shoes with good arch support. Athletic shoes or shoes with a well-cushioned sole are good choices. · Try heel cups or shoe inserts (orthotics) to help cushion your heel. You can buy these at many shoe stores. · Put on your shoes as soon as you get out of bed. Going barefoot or wearing slippers may make your pain worse. · Reach and stay at a good weight for your height. This puts less strain on your feet. When should you call for help? Call your doctor now or seek immediate medical care if:  · You have heel pain with fever, redness, or warmth in your heel.   · You cannot put weight on the sore foot. Watch closely for changes in your health, and be sure to contact your doctor if:  · You have numbness or tingling in your heel. · Your heel pain lasts more than 2 weeks. Where can you learn more? Go to http://kings-tavon.info/. Peb[employer] Post in the search box to learn more about \"Plantar Fasciitis: Care Instructions. \"  Current as of: March 21, 2017  Content Version: 11.4  © 8914-0856 [employer]. Care instructions adapted under license by [employer] (which disclaims liability or warranty for this information). If you have questions about a medical condition or this instruction, always ask your healthcare professional. [address] any warranty or liability for your use of this information. Plantar Fasciitis: Exercises  Your Care Instructions  Here are some examples of typical rehabilitation exercises for your condition. Start each exercise slowly. Ease off the exercise if you start to have pain. Your doctor or physical therapist will tell you when you can start these exercises and which ones will work best for you. How to do the exercises  Towel stretch    1. Sit with your legs extended and knees straight. 2. Place a towel around your foot just under the toes. 3. Hold each end of the towel in each hand, with your hands above your knees. 4. Pull back with the towel so that your foot stretches toward you. 5. Hold the position for at least 15 to 30 seconds. 6. Repeat 2 to 4 times a session, up to 5 sessions a day. Calf stretch    This exercise stretches the muscles at the back of the lower leg (the calf) and the Achilles tendon. Do this exercise 3 or 4 times a day, 5 days a week. 1. Stand facing a wall with your hands on the wall at about eye level. Put the leg you want to stretch about a step behind your other leg.   2. Keeping your back heel on the floor, bend your front knee until you feel a stretch in the back leg.  3. Hold the stretch for 15 to 30 seconds. Repeat 2 to 4 times. Plantar fascia and calf stretch    Stretching the plantar fascia and calf muscles can increase flexibility and decrease heel pain. You can do this exercise several times each day and before and after activity. 1. Stand on a step as shown above. Be sure to hold on to the banister. 2. Slowly let your heels down over the edge of the step as you relax your calf muscles. You should feel a gentle stretch across the bottom of your foot and up the back of your leg to your knee. 3. Hold the stretch about 15 to 30 seconds, and then tighten your calf muscle a little to bring your heel back up to the level of the step. Repeat 2 to 4 times. Towel curls    Make this exercise more challenging by placing a weighted object, such as a soup can, on the other end of the towel. 1. While sitting, place your foot on a towel on the floor and scrunch the towel toward you with your toes. 2. Then, also using your toes, push the towel away from you. Polebridge pickups    1. Put marbles on the floor next to a cup.  2. Using your toes, try to lift the marbles up from the floor and put them in the cup. Follow-up care is a key part of your treatment and safety. Be sure to make and go to all appointments, and call your doctor if you are having problems. It's also a good idea to know your test results and keep a list of the medicines you take. Where can you learn more? Go to http://kings-tavon.info/. Darreld Landau in the search box to learn more about \"Plantar Fasciitis: Exercises. \"  Current as of: March 21, 2017  Content Version: 11.4  © 9112-3663 [employer]. Care instructions adapted under license by [employer] (which disclaims liability or warranty for this information).  If you have questions about a medical condition or this instruction, always ask your healthcare professional. Patricia Hoover disclaims any warranty or liability for your use of this information.

## 2018-06-01 LAB — HEMOCCULT STL QL IA: NEGATIVE

## 2018-06-15 DIAGNOSIS — R19.7 DIARRHEA DUE TO MALABSORPTION: ICD-10-CM

## 2018-06-15 DIAGNOSIS — K90.9 DIARRHEA DUE TO MALABSORPTION: ICD-10-CM

## 2018-06-15 DIAGNOSIS — I10 HTN, GOAL BELOW 140/90: ICD-10-CM

## 2018-06-15 DIAGNOSIS — F06.4 ANXIETY DISORDER DUE TO KNOWN PHYSIOLOGICAL CONDITION: ICD-10-CM

## 2018-06-15 DIAGNOSIS — F40.01 PANIC DISORDER WITH AGORAPHOBIA: ICD-10-CM

## 2018-06-15 DIAGNOSIS — F43.0 PANIC ATTACK AS REACTION TO STRESS: ICD-10-CM

## 2018-06-15 DIAGNOSIS — F41.0 PANIC ATTACK AS REACTION TO STRESS: ICD-10-CM

## 2018-06-15 NOTE — TELEPHONE ENCOUNTER
[facility] Requests A 90 day supply on the patients behalf. Last Visit: 5/23/18-Marsha  Next Appt: None  Previous Refill Encounter: 5/23/18-30+6 refills    Requested Prescriptions     Pending Prescriptions Disp Refills    buPROPion XL (WELLBUTRIN XL) 150 mg tablet 90 Tab 0     Sig: Take 1 Tab by mouth every morning.

## 2018-06-19 RX ORDER — BUPROPION HYDROCHLORIDE 150 MG/1
150 TABLET ORAL
Qty: 90 TAB | Refills: 0 | Status: SHIPPED | OUTPATIENT
Start: 2018-06-19 | End: 2018-09-20 | Stop reason: SDUPTHER

## 2018-06-28 ENCOUNTER — HOSPITAL ENCOUNTER (OUTPATIENT)
Dept: MAMMOGRAPHY | Age: 53
Discharge: HOME | End: 2018-06-28
Attending: FAMILY MEDICINE
Payer: COMMERCIAL

## 2018-06-28 DIAGNOSIS — Z12.39 SCREENING FOR BREAST CANCER: ICD-10-CM

## 2018-06-28 PROCEDURE — 77067 SCR MAMMO BI INCL CAD: CPT

## 2018-07-22 DIAGNOSIS — F43.0 PANIC ATTACK AS REACTION TO STRESS: ICD-10-CM

## 2018-07-22 DIAGNOSIS — I10 HTN, GOAL BELOW 140/90: ICD-10-CM

## 2018-07-22 DIAGNOSIS — F06.4 ANXIETY DISORDER DUE TO KNOWN PHYSIOLOGICAL CONDITION: ICD-10-CM

## 2018-07-22 DIAGNOSIS — K90.9 DIARRHEA DUE TO MALABSORPTION: ICD-10-CM

## 2018-07-22 DIAGNOSIS — R19.7 DIARRHEA DUE TO MALABSORPTION: ICD-10-CM

## 2018-07-22 DIAGNOSIS — F40.01 PANIC DISORDER WITH AGORAPHOBIA: ICD-10-CM

## 2018-07-22 DIAGNOSIS — F41.0 PANIC ATTACK AS REACTION TO STRESS: ICD-10-CM

## 2018-07-23 RX ORDER — METOPROLOL SUCCINATE 50 MG/1
TABLET, EXTENDED RELEASE ORAL
Qty: 30 TAB | Refills: 0 | Status: SHIPPED | OUTPATIENT
Start: 2018-07-23 | End: 2019-07-29 | Stop reason: SDUPTHER

## 2018-09-20 DIAGNOSIS — I10 HTN, GOAL BELOW 140/90: ICD-10-CM

## 2018-09-20 DIAGNOSIS — F41.0 PANIC ATTACK AS REACTION TO STRESS: ICD-10-CM

## 2018-09-20 DIAGNOSIS — F43.0 PANIC ATTACK AS REACTION TO STRESS: ICD-10-CM

## 2018-09-20 DIAGNOSIS — F40.01 PANIC DISORDER WITH AGORAPHOBIA: ICD-10-CM

## 2018-09-20 DIAGNOSIS — R19.7 DIARRHEA DUE TO MALABSORPTION: ICD-10-CM

## 2018-09-20 DIAGNOSIS — K90.9 DIARRHEA DUE TO MALABSORPTION: ICD-10-CM

## 2018-09-20 DIAGNOSIS — F06.4 ANXIETY DISORDER DUE TO KNOWN PHYSIOLOGICAL CONDITION: ICD-10-CM

## 2018-09-25 RX ORDER — BUPROPION HYDROCHLORIDE 150 MG/1
TABLET ORAL
Qty: 90 TAB | Refills: 0 | Status: SHIPPED | OUTPATIENT
Start: 2018-09-25 | End: 2019-09-20 | Stop reason: ALTCHOICE

## 2018-10-06 DIAGNOSIS — F40.01 PANIC DISORDER WITH AGORAPHOBIA: ICD-10-CM

## 2018-10-06 DIAGNOSIS — F41.0 PANIC ATTACK AS REACTION TO STRESS: ICD-10-CM

## 2018-10-06 DIAGNOSIS — F43.0 PANIC ATTACK AS REACTION TO STRESS: ICD-10-CM

## 2018-10-08 RX ORDER — METOPROLOL SUCCINATE 25 MG/1
TABLET, EXTENDED RELEASE ORAL
Qty: 90 TAB | Refills: 3 | Status: SHIPPED | OUTPATIENT
Start: 2018-10-08 | End: 2019-09-20 | Stop reason: ALTCHOICE

## 2019-07-29 DIAGNOSIS — F41.0 PANIC ATTACK AS REACTION TO STRESS: ICD-10-CM

## 2019-07-29 DIAGNOSIS — I10 HTN, GOAL BELOW 140/90: ICD-10-CM

## 2019-07-29 DIAGNOSIS — F40.01 PANIC DISORDER WITH AGORAPHOBIA: ICD-10-CM

## 2019-07-29 DIAGNOSIS — K90.9 DIARRHEA DUE TO MALABSORPTION: ICD-10-CM

## 2019-07-29 DIAGNOSIS — R19.7 DIARRHEA DUE TO MALABSORPTION: ICD-10-CM

## 2019-07-29 DIAGNOSIS — F06.4 ANXIETY DISORDER DUE TO KNOWN PHYSIOLOGICAL CONDITION: ICD-10-CM

## 2019-07-29 DIAGNOSIS — F43.0 PANIC ATTACK AS REACTION TO STRESS: ICD-10-CM

## 2019-07-29 RX ORDER — METOPROLOL SUCCINATE 50 MG/1
TABLET, EXTENDED RELEASE ORAL
Qty: 90 TAB | Refills: 4 | Status: SHIPPED | OUTPATIENT
Start: 2019-07-29 | End: 2019-09-20 | Stop reason: ALTCHOICE

## 2019-09-20 ENCOUNTER — OFFICE VISIT (OUTPATIENT)
Dept: FAMILY MEDICINE CLINIC | Age: 54
End: 2019-09-20

## 2019-09-20 VITALS
DIASTOLIC BLOOD PRESSURE: 87 MMHG | TEMPERATURE: 97.9 F | HEART RATE: 66 BPM | SYSTOLIC BLOOD PRESSURE: 159 MMHG | RESPIRATION RATE: 20 BRPM | BODY MASS INDEX: 30.47 KG/M2 | HEIGHT: 62 IN | WEIGHT: 165.6 LBS | OXYGEN SATURATION: 100 %

## 2019-09-20 DIAGNOSIS — F06.4 ANXIETY DISORDER DUE TO KNOWN PHYSIOLOGICAL CONDITION: ICD-10-CM

## 2019-09-20 DIAGNOSIS — M79.604 PAIN OF RIGHT LOWER EXTREMITY: ICD-10-CM

## 2019-09-20 DIAGNOSIS — F43.0 PANIC ATTACK AS REACTION TO STRESS: ICD-10-CM

## 2019-09-20 DIAGNOSIS — F40.01 PANIC DISORDER WITH AGORAPHOBIA: ICD-10-CM

## 2019-09-20 DIAGNOSIS — M25.551 ACUTE PAIN OF RIGHT HIP: ICD-10-CM

## 2019-09-20 DIAGNOSIS — Z23 ENCOUNTER FOR IMMUNIZATION: ICD-10-CM

## 2019-09-20 DIAGNOSIS — F41.0 PANIC ATTACK AS REACTION TO STRESS: ICD-10-CM

## 2019-09-20 DIAGNOSIS — I10 HTN, GOAL BELOW 140/90: ICD-10-CM

## 2019-09-20 DIAGNOSIS — Z12.11 SCREEN FOR COLON CANCER: ICD-10-CM

## 2019-09-20 DIAGNOSIS — Z00.00 WELL WOMAN EXAM (NO GYNECOLOGICAL EXAM): Primary | ICD-10-CM

## 2019-09-20 RX ORDER — ALPRAZOLAM 0.5 MG/1
0.5 TABLET ORAL
Qty: 30 TAB | Refills: 2 | Status: SHIPPED | OUTPATIENT
Start: 2019-09-20 | End: 2020-06-08 | Stop reason: SDUPTHER

## 2019-09-20 RX ORDER — METHYLPREDNISOLONE 4 MG/1
TABLET ORAL
Qty: 1 DOSE PACK | Refills: 0 | Status: SHIPPED | OUTPATIENT
Start: 2019-09-20 | End: 2019-09-24

## 2019-09-20 RX ORDER — INDOMETHACIN 50 MG/1
50 CAPSULE ORAL 3 TIMES DAILY
Qty: 18 CAP | Refills: 0 | Status: SHIPPED | OUTPATIENT
Start: 2019-09-20 | End: 2019-09-24

## 2019-09-20 NOTE — PROGRESS NOTES
Name and  verified      Chief Complaint   Patient presents with    Leg Pain     Right. Patient denies injury         Health Maintenance reviewed-discussed with patient. 1. Have you been to the ER, urgent care clinic since your last visit? Hospitalized since your last visit? no    2. Have you seen or consulted any other health care providers outside of the [address] [place] [address] since your last visit? Include any pap smears or colon screening. no      Order placed for flu vaccine, per Verbal Order from Dr. Kwame Capone on 2019 due to [facility]. Patient declined flu vaccine.

## 2019-09-20 NOTE — LETTER
Name:Nixon Peres :1965 MR #:[id number] Provider Barry Jaramillo MD  
*TZBG-573* BSMG-491 () Page 1 of 5 Initial [place] CONTROLLED SUBSTANCE AGREEMENT I may be prescribed medications that are controlled substances as part  of my treatment plan for management of my medical condition(s). The goal of my treatment plan is to maintain and/or improve my health and wellbeing. Because controlled substances have an increased risk of abuse or harm, continual re-evaluation is needed determine if the goals of my treatment plan are being met for my safety and the safety of others. Lilianlillian Kimbrough  am entering into this Controlled Substance Agreement with my provider, Aman Murillo MD at [address] [place] [address] . I understand that successful treatment requires mutual trust and honesty between me and my provider. I understand that there are state and federal laws and regulations which apply to the medications that my provider may prescribe that must be followed. I understand there are risks and benefits ts of taking the medicines that my provider may prescribe. I understand and agree that following this Agreement is necessary in continuing my provider-patient relationship and success of my treatment plan. As a part of my treatment plan, I agree to the following: COMMUNICATION: 
 
1. I will communicate fully with my provider about my medical condition(s), including the effect on my daily life and how well my medications are helping. I will tell my provider all of the medications that I take for any reason, including medications I receive from another health care provider, and will notify my provider about all issues, problems or concerns, including any side effects, which may be related to my medications. I understand that this information allows my provider to adjust my treatment plan to help manage my medical condition.  I understand that this information will become part of my permanent medical record. 2. I will notify my provider if I have a history of alcohol/drug misuse/addiction or if I have had treatment for alcohol/drug addiction in the past, or if I have a new problem with or concern about alcohol/drug use/addiction, because this increases the likelihood of high risk behaviors and may lead to serious medical conditions. 3. Females Only: I will notify my provider if I am or become pregnant, or if I intend to become pregnant, or if I intend to breastfeed. I understand that communication of these issues with my provider is important, due to possible effects my medication could have on an unborn fetus or breastfeeding child. Name:.Rhonda Kenney :1965 MR #:[id number] Provider Homero Ventura MD  
*GBGU-353* BSMG-491 () Page 2 of 5 Initial [employer] MISUSE OF MEDICATIONS / DRUGS: 
 
1. I agree to take all controlled substances as prescribed, and will not misuse or abuse any controlled substances prescribed by my provider. For my safety, I will not increase the amount of medicine I take without first talking with and getting permission from my provider. 2. If I have a medical emergency, another health care provider may prescribe me medication. If I seek emergency treatment, I will notify my provider within seventy-two (72) hours. 3. I understand that my provider may discuss my use and/or possible misuse/abuse of controlled substances and alcohol, as appropriate, with any health care provider involved in my care, pharmacist or legal authority. ILLEGAL DRUGS: 
 
1. I will not use illegal drugs of any kind, including but not limited to marijuana, heroin, cocaine, or any prescription drug which is not prescribed to me. DRUG DIVERSION / PRESCRIPTION FRAUD: 
 
1. I will not share, sell, trade, give away, or otherwise misuse my prescriptions or medications. 2. I will not alter any prescriptions provided to me by my provider. SINGLE PROVIDER: 
 
1. I agree that all controlled substances that I take will be prescribed only by my provider (or his/her covering provider) under this Agreement. This agreement does not prevent me from seeking emergency medical treatment or receiving pain management related to a surgery. PROTECTING MEDICATIONS: 
 
1. I am responsible for keeping my prescriptions and medications in a safe and secure place including safeguarding them from loss or theft. I understand that lost, stolen or damaged/destroyed prescriptions or medications will not be replaced. Name:.Rhonda Peres :1965 MR #:[id number] Provider Barry Jaramillo MD  
*EFBP-857* BSMG-491 () Page 3 of 5 Initial [place] PRESCRIPTION RENEWALS/REFILLS: 
 
1. I will follow my controlled substance medication schedule as prescribed by my provider. 2. I understand and agree that I will make any requests for renewals or refills of my prescriptions only at the time of an office visit or during my providers regular office hours subject to the prescription refill requirements of the individual practice. 3. I understand that my provider may not call in prescriptions for controlled substances to my pharmacy. 4. I understand that my provider may adjust or discontinue these medications as deemed appropriate for my medical treatment plan. This Agreement does not guarantee the prescription of controlled medications. 5. I agree that if my medications are adjusted or discontinued, I will properly dispose of any remaining medications. I understand that I will be required to dispose of any remaining controlled medications prior to being provided with any prescriptions for other controlled medications.  
 
 
1. I authorize my provider and my pharmacy to cooperate fully with any local, state, or federal law enforcement agency in the investigation of any possible misuse, sale, or other diversion of my controlled substance prescriptions or medications. RISKS: 
 
 
1. I understand that if I do not adhere to this Agreement in any way, my provider may change my prescriptions, stop prescribing controlled substances or end our provider-patient relationship. 2. If my provider decides to stop prescribing medication, or decides to end our provider-patient relationship,my provider may require that I taper my medications slowly. If necessary, my provider may also provide a prescription for other medications to treat my withdrawal symptoms. UNDERSTANDING THIS AGREEMENT: 
 
I understand that my provider may adjust or stop my prescriptions for controlled substances based on my medical condition and my treatment plan. I understand that this Agreement does not guarantee that I will be prescribed medications or controlled substances. I understand that controlled substances may be just one part 
of my treatment plan. My initial on each page and my signature below shows that I have read each page of this Agreement, I have had an opportunity to ask questions, and all of my questions have been answered to my satisfaction by my provider. By signing below, I agree to comply with this Agreement, and I understand that if I do not follow the Agreements listed above, my provider may stop 
 
 
 
_________________________________________  Date/Time 9/20/2019 3:28 PM   
             (Patient Signature)

## 2019-09-20 NOTE — PATIENT INSTRUCTIONS
Vaccine Information Statement    Influenza (Flu) Vaccine (Inactivated or Recombinant): What You Need to Know    Many Vaccine Information Statements are available in [place]ian and other languages. See www.immunize.org/vis  Hojas de información sobre vacunas están disponibles en español y en muchos otros idiomas. Visite www.immunize.org/vis    1. Why get vaccinated? Influenza vaccine can prevent influenza (flu). Flu is a contagious disease that spreads around the [facility] every year, usually between October and May. Anyone can get the flu, but it is more dangerous for some people. Infants and young children, people 72years of age and older, pregnant women, and people with certain health conditions or a weakened immune system are at greatest risk of flu complications. Pneumonia, bronchitis, sinus infections and ear infections are examples of flu-related complications. If you have a medical condition, such as heart disease, cancer or diabetes, flu can make it worse. Flu can cause fever and chills, sore throat, muscle aches, fatigue, cough, headache, and runny or stuffy nose. Some people may have vomiting and diarrhea, though this is more common in children than adults. Each year thousands of people in the [facility] die from flu, and many more are hospitalized. Flu vaccine prevents millions of illnesses and flu-related visits to the doctor each year. 2. Influenza vaccines     CDC recommends everyone 10months of age and older get vaccinated every flu season. Children 6 months through 6years of age may need 2 doses during a single flu season. Everyone else needs only 1 dose each flu season. It takes about 2 weeks for protection to develop after vaccination. There are many flu viruses, and they are always changing. Each year a new flu vaccine is made to protect against three or four viruses that are likely to cause disease in the upcoming flu season.  Even when the vaccine doesnt exactly match these viruses, it may still provide some protection. Influenza vaccine does not cause flu. Influenza vaccine may be given at the same time as other vaccines. 3. Talk with your health care provider    Tell your vaccine provider if the person getting the vaccine:   Has had an allergic reaction after a previous dose of influenza vaccine, or has any severe, life-threatening allergies.  Has ever had Guillain-Barré Syndrome (also called GBS). In some cases, your health care provider may decide to postpone influenza vaccination to a future visit. People with minor illnesses, such as a cold, may be vaccinated. People who are moderately or severely ill should usually wait until they recover before getting influenza vaccine. Your health care provider can give you more information. 4. Risks of a reaction     Soreness, redness, and swelling where shot is given, fever, muscle aches, and headache can happen after influenza vaccine.  There may be a very small increased risk of Guillain-Barré Syndrome (GBS) after inactivated influenza vaccine (the flu shot). Josef Mao children who get the flu shot along with pneumococcal vaccine (PCV13), and/or DTaP vaccine at the same time might be slightly more likely to have a seizure caused by fever. Tell your health care provider if a child who is getting flu vaccine has ever had a seizure. People sometimes faint after medical procedures, including vaccination. Tell your provider if you feel dizzy or have vision changes or ringing in the ears. As with any medicine, there is a very remote chance of a vaccine causing a severe allergic reaction, other serious injury, or death. 5. What if there is a serious problem? An allergic reaction could occur after the vaccinated person leaves the clinic.  If you see signs of a severe allergic reaction (hives, swelling of the face and throat, difficulty breathing, a fast heartbeat, dizziness, or weakness), call 9-1-1 and get the person to the nearest hospital.    For other signs that concern you, call your health care provider. Adverse reactions should be reported to the Vaccine Adverse Event Reporting System (VAERS). Your health care provider will usually file this report, or you can do it yourself. Visit the VAERS website at www.vaers. [facility].gov or call 5-762.934.3552. VAERS is only for reporting reactions, and VAERS staff do not give medical advice. 6. The National Vaccine Injury Compensation Program    The [facility] Vaccine Injury Compensation Program (VICP) is a federal program that was created to compensate people who may have been injured by certain vaccines. Visit the VICP website at www.hrsa.gov/vaccinecompensation or call 0-799.503.4776 to learn about the program and about filing a claim. There is a time limit to file a claim for compensation. 7. How can I learn more?  Ask your health care provider.  Call your local or state health department.  Contact the Centers for Disease Control and Prevention (CDC):  - Call 8-736.585.2109 (1-800-CDC-INFO) or  - Visit CDCs influenza website at www.cdc.gov/flu    Vaccine Information Statement (Interim)  Inactivated Influenza Vaccine   8/15/2019  [address] [id number]   Department of Health and Human Services  Centers for Disease Control and Prevention    Office Use Only

## 2019-09-20 NOTE — PROGRESS NOTES
HISTORY OF PRESENT ILLNESS  Donnell Santana is a 48 y.o. female. HPI   Specific concerns today: Patient currently less stressed out went to marriage counselor stating that they are both cooperating now last visit she was given Wellbutrin and Xanax in 2018 patient states that the Xanax has helped has not taken much she still has 3 tablets left from last year she states that she gets sometimes panic attack and she needs to take something to calm her down requesting a refill aware of the addictive side effect aware that she is not able to drink any alcoholic beverages with this medication denies any suicidal homicidal ideation    HTN  Today pt present for Bp check and++= Compliancy w/ the bp meds, having had the low salt diet ,  has been active, patien does not obtain the bp at home ,, today the pt denies Chest Pain, has no legs swelling no lightheadedness,     HIP  pain  The history is provided by the patient. This is a chronic problem. Episode onset:3yrs ago, pt's MBI is at 30.3, pt is working at this time [employer] workerbut is cumbersome for the patient, to do any mopping, heavy pushing and lifting, , pt is currently able to walk w/out any mobility device. and the patient states that the pain is worsening specially by going up and down the steps . The problem occurs constantly. The problem has changed and worsening since onset. The pain is present in the RT Hip area. is a dull pain and  is at a severity of 6/10. the pt has the AM stiffness, but denies numbness and tingling sensations  There has been no history of extremity trauma.  no incontinence of urine nor of stool, and no lower ext Weaknesses  leg pain,  The pain is mostly on the right side patient has 2 job both are physically demanding and the patient does a lot of standing during the day   patient pain  is 4 out of 10, worsening but end of the day,  patient is able to attend work and do job requirement, stating that sometimes the intensity of the pain is not allowing the patient to do work resting helps,  has tried over the counter pain medication and also stating that if the patient takes a lot of ibuprofen medication does create stomach upset, the lower extremity is no varicosities and tender to touch,       Current Outpatient Medications   Medication Sig Dispense Refill    ALPRAZolam (XANAX) 0.5 mg tablet Take 1 Tab by mouth nightly as needed for Anxiety or Sleep. Max Daily Amount: 0.5 mg. 30 Tab 2    indomethacin (INDOCIN) 50 mg capsule Take 1 Cap by mouth three (3) times daily for 6 days. 18 Cap 0    methylPREDNISolone (MEDROL DOSEPACK) 4 mg tablet As directed for 6 days 1 Dose Pack 0    metoprolol succinate (TOPROL-XL) 50 mg XL tablet Take 1 Tab by mouth nightly.  90 Tab 4    lactase (LACTOSE FAST ACTING) 9,000 unit chew Please take 1 tablet 15-20 minutes before taking any dairy product  Indications: LACTOSE INTOLERANCE 90 Tab 3     No Known Allergies  Past Medical History:   Diagnosis Date    Anxiety disorder 4/25/2018    Cervical pain (neck) 1/27/2015    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    Elevated BP 6/6/2016    Elevated heart rate and blood pressure 6/30/2016    HTN, goal below 140/90 9/21/2017    Panic attack as reaction to stress 6/30/2016    Panic disorder with agoraphobia 6/30/2016    Right shoulder pain 1/27/2015    Thyroid nodule      Past Surgical History:   Procedure Laterality Date    HX GYN      HX HYSTERECTOMY  2012    THYROIDECTOMY  2002     Family History   Problem Relation Age of Onset    Hypertension Mother     Hypertension Sister      Social History     Tobacco Use    Smoking status: Never Smoker    Smokeless tobacco: Never Used   Substance Use Topics    Alcohol use: No      Lab Results   Component Value Date/Time    Glucose 81 09/21/2017 01:52 PM    LDL, calculated 116 (H) 03/30/2011 04:19 PM    Creatinine 0.87 09/21/2017 01:52 PM      Lab Results   Component Value Date/Time    Cholesterol, total 177 09/21/2017 01:52 PM    HDL Cholesterol 65 09/21/2017 01:52 PM    LDL, calculated 116 (H) 03/30/2011 04:19 PM    Triglyceride 46 03/30/2011 04:19 PM        ROS    Physical Exam    ASSESSMENT and PLAN  Diagnoses and all orders for this visit:    1. Well woman exam (no gynecological exam)  Comments:  [V70.0]  Orders:  -     CBC W/O DIFF  -     URINALYSIS W/ RFLX MICROSCOPIC    2. Panic attack as reaction to stress  -     ALPRAZolam (XANAX) 0.5 mg tablet; Take 1 Tab by mouth nightly as needed for Anxiety or Sleep. Max Daily Amount: 0.5 mg.  -     CBC W/O DIFF    3. Panic disorder with agoraphobia  -     ALPRAZolam (XANAX) 0.5 mg tablet; Take 1 Tab by mouth nightly as needed for Anxiety or Sleep. Max Daily Amount: 0.5 mg.  -     indomethacin (INDOCIN) 50 mg capsule; Take 1 Cap by mouth three (3) times daily for 6 days. -     methylPREDNISolone (MEDROL DOSEPACK) 4 mg tablet; As directed for 6 days  -     CBC W/O DIFF    4. Anxiety disorder due to known physiological condition  -     ALPRAZolam (XANAX) 0.5 mg tablet; Take 1 Tab by mouth nightly as needed for Anxiety or Sleep. Max Daily Amount: 0.5 mg.  -     CBC W/O DIFF  -     METABOLIC PANEL, COMPREHENSIVE  -     TSH 3RD GENERATION  -     HEMOGLOBIN A1C WITH EAG  -     HDL CHOLESTEROL  -     CHOLESTEROL, TOTAL    5. HTN, goal below 140/90  -     ALPRAZolam (XANAX) 0.5 mg tablet; Take 1 Tab by mouth nightly as needed for Anxiety or Sleep. Max Daily Amount: 0.5 mg.  -     CBC W/O DIFF  -     METABOLIC PANEL, COMPREHENSIVE  -     TSH 3RD GENERATION  -     HEMOGLOBIN A1C WITH EAG  -     HDL CHOLESTEROL  -     CHOLESTEROL, TOTAL  -     URINALYSIS W/ RFLX MICROSCOPIC    6. Encounter for immunization  -     INFLUENZA VIRUS VAC QUAD,SPLIT,PRESV FREE SYRINGE IM  -     CBC W/O DIFF    7. Screen for colon cancer  -     CBC W/O DIFF  -     URINALYSIS W/ RFLX MICROSCOPIC    8.  Acute pain of right hip  -     XR HIP RT W OR WO PELV 2-3 VWS; Future  -     CBC W/O DIFF  -     URINALYSIS W/ RFLX MICROSCOPIC    9. Pain of right lower extremity  -     DUPLEX LOWER EXT VENOUS BILAT; Future  -     CBC W/O DIFF  -     URINALYSIS W/ RFLX MICROSCOPIC      Subjective:   48 y.o. female for Well Woman Check. Patient also present for CPE,Her gyne and breast care is done elsewhere by her Ob-Gyne physician. last Complete Physical exam was few years ago, patient currently is not Up todate w/ all vaccination, last tetanus vaccine was in . last mammog and her last pap smear normal and was in 2019,      last colonoscopy was never, patient has had negative stool test not willing to do colonoscopy but willing to repeat her stool test  +past surgical hx thyroid resection ,  last bone dexa scan was never     No family hx of breast cancer      no family hx of colon cancer, father  of brain tumor at age 52, mother With htn, +sexaully active and uses [employer], +physically active,      Current Outpatient Medications   Medication Sig Dispense Refill    ALPRAZolam (XANAX) 0.5 mg tablet Take 1 Tab by mouth nightly as needed for Anxiety or Sleep. Max Daily Amount: 0.5 mg. 30 Tab 2    indomethacin (INDOCIN) 50 mg capsule Take 1 Cap by mouth three (3) times daily for 6 days. 18 Cap 0    methylPREDNISolone (MEDROL DOSEPACK) 4 mg tablet As directed for 6 days 1 Dose Pack 0    metoprolol succinate (TOPROL-XL) 50 mg XL tablet Take 1 Tab by mouth nightly.  90 Tab 4    lactase (LACTOSE FAST ACTING) 9,000 unit chew Please take 1 tablet 15-20 minutes before taking any dairy product  Indications: LACTOSE INTOLERANCE 90 Tab 3     No Known Allergies  Past Medical History:   Diagnosis Date    Anxiety disorder 2018    Cervical pain (neck) 2015    Diarrhea due to malabsorption 2018    Elevated BP 2016    Elevated heart rate and blood pressure 2016    HTN, goal below 140/90 2017    Panic attack as reaction to stress 2016    Panic disorder with agoraphobia 2016    Right shoulder pain 1/27/2015    Thyroid nodule      Past Surgical History:   Procedure Laterality Date    HX GYN      HX HYSTERECTOMY  2012    THYROIDECTOMY  2002     Family History   Problem Relation Age of Onset    Hypertension Mother     Hypertension Sister      Social History     Tobacco Use    Smoking status: Never Smoker    Smokeless tobacco: Never Used   Substance Use Topics    Alcohol use: No        Lab Results   Component Value Date/Time    WBC 4.7 09/21/2017 01:52 PM    HGB 13.6 09/21/2017 01:52 PM    HCT 40.1 09/21/2017 01:52 PM    PLATELET 948 66/20/2233 01:52 PM    MCV 88 09/21/2017 01:52 PM     Lab Results   Component Value Date/Time    Glucose 81 09/21/2017 01:52 PM    LDL, calculated 116 (H) 03/30/2011 04:19 PM    Creatinine 0.87 09/21/2017 01:52 PM      Lab Results   Component Value Date/Time    Cholesterol, total 177 09/21/2017 01:52 PM    HDL Cholesterol 65 09/21/2017 01:52 PM    LDL, calculated 116 (H) 03/30/2011 04:19 PM    Triglyceride 46 03/30/2011 04:19 PM            Review of Systems    Constitutional: no chills and fever, obese, nad     HENT: no ear head pain and nosebleeds. No blurred vision, pain and discharge. Respiratory: no shortness of breath, wheezing cough nor sore throat. Cardiovascular: Has no chest pain, leg swelling ,and racing heart . Gastrointestinal: No constipation, diarrhea, nausea and vomiting. Genitourinary: No frequency. Musculoskeletal: Negative for joint pain. Skin: no itching, pimples or acne rash. Neurological: Negative for dizziness, no tremors  Psychiatric/Behavioral: Negative for depression has normal interest to do things and not depressed the patient is nervous/anxious. Objective:   Blood pressure 159/87, pulse 66, temperature 97.9 °F (36.6 °C), temperature source Oral, resp. rate 20, height 5' 2\" (1.575 m), weight 165 lb 9.6 oz (75.1 kg), SpO2 100 %.    Physical Examination:     General: Patient alert cooperative appears stated for the age  [de-identified]; normocephalic and atraumatic PERRLA extraocular motor intact normal tympanic membrane normal external ear bilaterally normal sinuses with mucosal normal no drainage  Neck: supple no adenopathy no JVD no bruit  Lungs: Clear to auscultation bilaterally no rales rhonchi or wheezes  Heart: Normal regular S1-S2 no gallops rubs or clicks no murmur  Breast exam deferred  Abdomen: Soft nontender normal bowel sounds no organomegaly  Extremities: Normal range of motion no point tenderness normal pulses no edema  Pelvic/: No lesion, no lymphadenopathy  Skin: Normal no lesion no rash      Assessment/Plan:     Diagnoses and all orders for this visit:    1. Well woman exam (no gynecological exam)  Comments:  [V70.0]  Orders:  -     CBC W/O DIFF  -     URINALYSIS W/ RFLX MICROSCOPIC    2. Panic attack as reaction to stress  -     ALPRAZolam (XANAX) 0.5 mg tablet; Take 1 Tab by mouth nightly as needed for Anxiety or Sleep. Max Daily Amount: 0.5 mg.  -     CBC W/O DIFF    3. Panic disorder with agoraphobia  -     ALPRAZolam (XANAX) 0.5 mg tablet; Take 1 Tab by mouth nightly as needed for Anxiety or Sleep. Max Daily Amount: 0.5 mg.  -     indomethacin (INDOCIN) 50 mg capsule; Take 1 Cap by mouth three (3) times daily for 6 days. -     methylPREDNISolone (MEDROL DOSEPACK) 4 mg tablet; As directed for 6 days  -     CBC W/O DIFF    4. Anxiety disorder due to known physiological condition  -     ALPRAZolam (XANAX) 0.5 mg tablet; Take 1 Tab by mouth nightly as needed for Anxiety or Sleep. Max Daily Amount: 0.5 mg.  -     CBC W/O DIFF  -     METABOLIC PANEL, COMPREHENSIVE  -     TSH 3RD GENERATION  -     HEMOGLOBIN A1C WITH EAG  -     HDL CHOLESTEROL  -     CHOLESTEROL, TOTAL    5. HTN, goal below 140/90  -     ALPRAZolam (XANAX) 0.5 mg tablet; Take 1 Tab by mouth nightly as needed for Anxiety or Sleep.  Max Daily Amount: 0.5 mg.  -     CBC W/O DIFF  -     METABOLIC PANEL, COMPREHENSIVE  -     TSH 3RD GENERATION  - HEMOGLOBIN A1C WITH EAG  -     HDL CHOLESTEROL  -     CHOLESTEROL, TOTAL  -     URINALYSIS W/ RFLX MICROSCOPIC    6. Encounter for immunization  -     INFLUENZA VIRUS VAC QUAD,SPLIT,PRESV FREE SYRINGE IM  -     CBC W/O DIFF    7. Screen for colon cancer  -     CBC W/O DIFF  -     URINALYSIS W/ RFLX MICROSCOPIC  -     COLOGUARD TEST (FECAL DNA COLORECTAL CANCER SCREENING)    8. Acute pain of right hip  -     XR HIP RT W OR WO PELV 2-3 VWS; Future  -     CBC W/O DIFF  -     URINALYSIS W/ RFLX MICROSCOPIC    9. Pain of right lower extremity  -     DUPLEX LOWER EXT VENOUS BILAT;  Future  -     CBC W/O DIFF  -     URINALYSIS W/ RFLX MICROSCOPIC      Depression with anxiety in a stable condition, not suicidal, start antianxiety and calming medication for now will reevaluate in 3 w for progression   in addition Counseling , social support, spiritual belonging, inc physical activity, all offered,  compliance advised, patient was told that should not mix any of current medication with any other illicit drugs, should not drink any alcoholic beverages while on such medication patient was also told to not operate any machinery while under the influence patient acknowledged understanding and agreed with today's recommendation in addition patient was told to call for any concern       lose weight, increase physical activity, limit alcohol consumption, follow low fat diet, follow low salt diet, continue present plan, routine labs ordered

## 2019-09-21 LAB
ALBUMIN SERPL-MCNC: 5 G/DL (ref 3.5–5.5)
ALBUMIN/GLOB SERPL: 1.9 {RATIO} (ref 1.2–2.2)
ALP SERPL-CCNC: 91 IU/L (ref 39–117)
ALT SERPL-CCNC: 27 IU/L (ref 0–32)
APPEARANCE UR: CLEAR
AST SERPL-CCNC: 23 IU/L (ref 0–40)
BILIRUB SERPL-MCNC: 0.3 MG/DL (ref 0–1.2)
BILIRUB UR QL STRIP: NEGATIVE
BUN SERPL-MCNC: 14 MG/DL (ref 6–24)
BUN/CREAT SERPL: 14 (ref 9–23)
CALCIUM SERPL-MCNC: 10 MG/DL (ref 8.7–10.2)
CHLORIDE SERPL-SCNC: 106 MMOL/L (ref 96–106)
CHOLEST SERPL-MCNC: 206 MG/DL (ref 100–199)
CO2 SERPL-SCNC: 22 MMOL/L (ref 20–29)
COLOR UR: YELLOW
CREAT SERPL-MCNC: 0.99 MG/DL (ref 0.57–1)
ERYTHROCYTE [DISTWIDTH] IN BLOOD BY AUTOMATED COUNT: 13.5 % (ref 12.3–15.4)
EST. AVERAGE GLUCOSE BLD GHB EST-MCNC: 128 MG/DL
GLOBULIN SER CALC-MCNC: 2.7 G/DL (ref 1.5–4.5)
GLUCOSE SERPL-MCNC: 97 MG/DL (ref 65–99)
GLUCOSE UR QL: NEGATIVE
HBA1C MFR BLD: 6.1 % (ref 4.8–5.6)
HCT VFR BLD AUTO: 40.8 % (ref 34–46.6)
HDLC SERPL-MCNC: 68 MG/DL
HGB BLD-MCNC: 13.9 G/DL (ref 11.1–15.9)
HGB UR QL STRIP: NEGATIVE
KETONES UR QL STRIP: NEGATIVE
LEUKOCYTE ESTERASE UR QL STRIP: NEGATIVE
MCH RBC QN AUTO: 30.4 PG (ref 26.6–33)
MCHC RBC AUTO-ENTMCNC: 34.1 G/DL (ref 31.5–35.7)
MCV RBC AUTO: 89 FL (ref 79–97)
MICRO URNS: NORMAL
NITRITE UR QL STRIP: NEGATIVE
PH UR STRIP: 6 [PH] (ref 5–7.5)
PLATELET # BLD AUTO: 283 X10E3/UL (ref 150–450)
POTASSIUM SERPL-SCNC: 4.4 MMOL/L (ref 3.5–5.2)
PROT SERPL-MCNC: 7.7 G/DL (ref 6–8.5)
PROT UR QL STRIP: NEGATIVE
RBC # BLD AUTO: 4.57 X10E6/UL (ref 3.77–5.28)
SODIUM SERPL-SCNC: 146 MMOL/L (ref 134–144)
SP GR UR: 1.02 (ref 1–1.03)
TSH SERPL DL<=0.005 MIU/L-ACNC: 1.96 UIU/ML (ref 0.45–4.5)
UROBILINOGEN UR STRIP-MCNC: 1 MG/DL (ref 0.2–1)
WBC # BLD AUTO: 4.9 X10E3/UL (ref 3.4–10.8)

## 2019-09-22 ENCOUNTER — HOSPITAL ENCOUNTER (OUTPATIENT)
Age: 54
Setting detail: OBSERVATION
Discharge: HOME | End: 2019-09-24
Attending: EMERGENCY MEDICINE | Admitting: INTERNAL MEDICINE
Payer: COMMERCIAL

## 2019-09-22 ENCOUNTER — APPOINTMENT (OUTPATIENT)
Dept: CT IMAGING | Age: 54
End: 2019-09-22
Attending: EMERGENCY MEDICINE
Payer: COMMERCIAL

## 2019-09-22 DIAGNOSIS — R77.8 ELEVATED TROPONIN: ICD-10-CM

## 2019-09-22 DIAGNOSIS — I16.0 HYPERTENSIVE URGENCY: Primary | ICD-10-CM

## 2019-09-22 LAB
ALBUMIN SERPL-MCNC: 4.6 G/DL (ref 3.5–5)
ALBUMIN/GLOB SERPL: 1.2 {RATIO} (ref 1.1–2.2)
ALP SERPL-CCNC: 99 U/L (ref 45–117)
ALT SERPL-CCNC: 35 U/L (ref 12–78)
ANION GAP SERPL CALC-SCNC: 8 MMOL/L (ref 5–15)
APPEARANCE UR: CLEAR
AST SERPL-CCNC: 20 U/L (ref 15–37)
BACTERIA URNS QL MICRO: NEGATIVE /HPF
BASOPHILS # BLD: 0 K/UL (ref 0–0.1)
BASOPHILS NFR BLD: 0 % (ref 0–1)
BILIRUB SERPL-MCNC: 0.3 MG/DL (ref 0.2–1)
BILIRUB UR QL: NEGATIVE
BUN SERPL-MCNC: 13 MG/DL (ref 6–20)
BUN/CREAT SERPL: 12 (ref 12–20)
CALCIUM SERPL-MCNC: 9.5 MG/DL (ref 8.5–10.1)
CHLORIDE SERPL-SCNC: 111 MMOL/L (ref 97–108)
CO2 SERPL-SCNC: 25 MMOL/L (ref 21–32)
COLOR UR: ABNORMAL
CREAT SERPL-MCNC: 1.07 MG/DL (ref 0.55–1.02)
DIFFERENTIAL METHOD BLD: ABNORMAL
EOSINOPHIL # BLD: 0 K/UL (ref 0–0.4)
EOSINOPHIL NFR BLD: 0 % (ref 0–7)
EPITH CASTS URNS QL MICRO: ABNORMAL /LPF
ERYTHROCYTE [DISTWIDTH] IN BLOOD BY AUTOMATED COUNT: 13.2 % (ref 11.5–14.5)
GLOBULIN SER CALC-MCNC: 4 G/DL (ref 2–4)
GLUCOSE SERPL-MCNC: 210 MG/DL (ref 65–100)
GLUCOSE UR STRIP.AUTO-MCNC: NEGATIVE MG/DL
HCT VFR BLD AUTO: 41.6 % (ref 35–47)
HGB BLD-MCNC: 14.2 G/DL (ref 11.5–16)
HGB UR QL STRIP: ABNORMAL
HYALINE CASTS URNS QL MICRO: ABNORMAL /LPF (ref 0–5)
IMM GRANULOCYTES # BLD AUTO: 0 K/UL (ref 0–0.04)
IMM GRANULOCYTES NFR BLD AUTO: 0 % (ref 0–0.5)
KETONES UR QL STRIP.AUTO: NEGATIVE MG/DL
LEUKOCYTE ESTERASE UR QL STRIP.AUTO: NEGATIVE
LYMPHOCYTES # BLD: 0.9 K/UL (ref 0.8–3.5)
LYMPHOCYTES NFR BLD: 9 % (ref 12–49)
MCH RBC QN AUTO: 30 PG (ref 26–34)
MCHC RBC AUTO-ENTMCNC: 34.1 G/DL (ref 30–36.5)
MCV RBC AUTO: 87.8 FL (ref 80–99)
MONOCYTES # BLD: 0.6 K/UL (ref 0–1)
MONOCYTES NFR BLD: 7 % (ref 5–13)
NEUTS SEG # BLD: 7.7 K/UL (ref 1.8–8)
NEUTS SEG NFR BLD: 84 % (ref 32–75)
NITRITE UR QL STRIP.AUTO: NEGATIVE
NRBC # BLD: 0 K/UL (ref 0–0.01)
NRBC BLD-RTO: 0 PER 100 WBC
PH UR STRIP: 7 [PH] (ref 5–8)
PLATELET # BLD AUTO: 301 K/UL (ref 150–400)
PMV BLD AUTO: 10 FL (ref 8.9–12.9)
POTASSIUM SERPL-SCNC: 3.7 MMOL/L (ref 3.5–5.1)
PROT SERPL-MCNC: 8.6 G/DL (ref 6.4–8.2)
PROT UR STRIP-MCNC: ABNORMAL MG/DL
RBC # BLD AUTO: 4.74 M/UL (ref 3.8–5.2)
RBC #/AREA URNS HPF: ABNORMAL /HPF (ref 0–5)
SODIUM SERPL-SCNC: 144 MMOL/L (ref 136–145)
SP GR UR REFRACTOMETRY: 1.02 (ref 1–1.03)
TROPONIN I SERPL-MCNC: 0.12 NG/ML
TROPONIN I SERPL-MCNC: 0.16 NG/ML
TSH SERPL DL<=0.05 MIU/L-ACNC: 0.72 UIU/ML (ref 0.36–3.74)
UA: UC IF INDICATED,UAUC: ABNORMAL
UROBILINOGEN UR QL STRIP.AUTO: 0.2 EU/DL (ref 0.2–1)
WBC # BLD AUTO: 9.2 K/UL (ref 3.6–11)
WBC URNS QL MICRO: ABNORMAL /HPF (ref 0–4)

## 2019-09-22 PROCEDURE — 81001 URINALYSIS AUTO W/SCOPE: CPT

## 2019-09-22 PROCEDURE — 36415 COLL VENOUS BLD VENIPUNCTURE: CPT

## 2019-09-22 PROCEDURE — 74011250637 HC RX REV CODE- 250/637: Performed by: INTERNAL MEDICINE

## 2019-09-22 PROCEDURE — 74011636320 HC RX REV CODE- 636/320: Performed by: EMERGENCY MEDICINE

## 2019-09-22 PROCEDURE — 74011250636 HC RX REV CODE- 250/636: Performed by: INTERNAL MEDICINE

## 2019-09-22 PROCEDURE — 93005 ELECTROCARDIOGRAM TRACING: CPT

## 2019-09-22 PROCEDURE — 99285 EMERGENCY DEPT VISIT HI MDM: CPT

## 2019-09-22 PROCEDURE — 99218 HC RM OBSERVATION: CPT

## 2019-09-22 PROCEDURE — 96372 THER/PROPH/DIAG INJ SC/IM: CPT

## 2019-09-22 PROCEDURE — 96375 TX/PRO/DX INJ NEW DRUG ADDON: CPT

## 2019-09-22 PROCEDURE — 96374 THER/PROPH/DIAG INJ IV PUSH: CPT

## 2019-09-22 PROCEDURE — 70450 CT HEAD/BRAIN W/O DYE: CPT

## 2019-09-22 PROCEDURE — 74011250636 HC RX REV CODE- 250/636: Performed by: EMERGENCY MEDICINE

## 2019-09-22 PROCEDURE — 51798 US URINE CAPACITY MEASURE: CPT

## 2019-09-22 PROCEDURE — 80053 COMPREHEN METABOLIC PANEL: CPT

## 2019-09-22 PROCEDURE — 84443 ASSAY THYROID STIM HORMONE: CPT

## 2019-09-22 PROCEDURE — 70496 CT ANGIOGRAPHY HEAD: CPT

## 2019-09-22 PROCEDURE — 84484 ASSAY OF TROPONIN QUANT: CPT

## 2019-09-22 PROCEDURE — 74011000250 HC RX REV CODE- 250: Performed by: EMERGENCY MEDICINE

## 2019-09-22 PROCEDURE — 74011250637 HC RX REV CODE- 250/637: Performed by: EMERGENCY MEDICINE

## 2019-09-22 PROCEDURE — 85025 COMPLETE CBC W/AUTO DIFF WBC: CPT

## 2019-09-22 RX ORDER — KETOROLAC TROMETHAMINE 30 MG/ML
15 INJECTION, SOLUTION INTRAMUSCULAR; INTRAVENOUS
Status: DISCONTINUED | OUTPATIENT
Start: 2019-09-22 | End: 2019-09-22

## 2019-09-22 RX ORDER — METOPROLOL SUCCINATE 50 MG/1
50 TABLET, EXTENDED RELEASE ORAL
Status: DISCONTINUED | OUTPATIENT
Start: 2019-09-22 | End: 2019-09-24 | Stop reason: HOSPADM

## 2019-09-22 RX ORDER — HYDROCODONE BITARTRATE AND ACETAMINOPHEN 5; 325 MG/1; MG/1
1 TABLET ORAL
Status: DISCONTINUED | OUTPATIENT
Start: 2019-09-22 | End: 2019-09-24 | Stop reason: HOSPADM

## 2019-09-22 RX ORDER — SODIUM CHLORIDE 0.9 % (FLUSH) 0.9 %
5-40 SYRINGE (ML) INJECTION AS NEEDED
Status: DISCONTINUED | OUTPATIENT
Start: 2019-09-22 | End: 2019-09-24 | Stop reason: HOSPADM

## 2019-09-22 RX ORDER — ACETAMINOPHEN 500 MG
500 TABLET ORAL
Status: DISCONTINUED | OUTPATIENT
Start: 2019-09-22 | End: 2019-09-24 | Stop reason: HOSPADM

## 2019-09-22 RX ORDER — SODIUM CHLORIDE 0.9 % (FLUSH) 0.9 %
10 SYRINGE (ML) INJECTION
Status: COMPLETED | OUTPATIENT
Start: 2019-09-22 | End: 2019-09-22

## 2019-09-22 RX ORDER — SODIUM CHLORIDE 0.9 % (FLUSH) 0.9 %
5-40 SYRINGE (ML) INJECTION EVERY 8 HOURS
Status: DISCONTINUED | OUTPATIENT
Start: 2019-09-22 | End: 2019-09-24 | Stop reason: HOSPADM

## 2019-09-22 RX ORDER — DIPHENHYDRAMINE HYDROCHLORIDE 50 MG/ML
25 INJECTION, SOLUTION INTRAMUSCULAR; INTRAVENOUS
Status: COMPLETED | OUTPATIENT
Start: 2019-09-22 | End: 2019-09-22

## 2019-09-22 RX ORDER — METOCLOPRAMIDE HYDROCHLORIDE 5 MG/ML
5 INJECTION INTRAMUSCULAR; INTRAVENOUS ONCE
Status: COMPLETED | OUTPATIENT
Start: 2019-09-22 | End: 2019-09-22

## 2019-09-22 RX ORDER — HYDRALAZINE HYDROCHLORIDE 20 MG/ML
10 INJECTION INTRAMUSCULAR; INTRAVENOUS
Status: DISCONTINUED | OUTPATIENT
Start: 2019-09-22 | End: 2019-09-24 | Stop reason: HOSPADM

## 2019-09-22 RX ORDER — NALOXONE HYDROCHLORIDE 0.4 MG/ML
0.4 INJECTION, SOLUTION INTRAMUSCULAR; INTRAVENOUS; SUBCUTANEOUS AS NEEDED
Status: DISCONTINUED | OUTPATIENT
Start: 2019-09-22 | End: 2019-09-24 | Stop reason: HOSPADM

## 2019-09-22 RX ORDER — LABETALOL HYDROCHLORIDE 5 MG/ML
10 INJECTION, SOLUTION INTRAVENOUS
Status: COMPLETED | OUTPATIENT
Start: 2019-09-22 | End: 2019-09-22

## 2019-09-22 RX ORDER — ACETAMINOPHEN 325 MG/1
650 TABLET ORAL
Status: DISCONTINUED | OUTPATIENT
Start: 2019-09-22 | End: 2019-09-22

## 2019-09-22 RX ORDER — ONDANSETRON 2 MG/ML
2 INJECTION INTRAMUSCULAR; INTRAVENOUS
Status: DISCONTINUED | OUTPATIENT
Start: 2019-09-22 | End: 2019-09-24 | Stop reason: HOSPADM

## 2019-09-22 RX ORDER — ONDANSETRON 2 MG/ML
4 INJECTION INTRAMUSCULAR; INTRAVENOUS
Status: DISCONTINUED | OUTPATIENT
Start: 2019-09-22 | End: 2019-09-22

## 2019-09-22 RX ORDER — ACETAMINOPHEN 500 MG
1000 TABLET ORAL ONCE
Status: COMPLETED | OUTPATIENT
Start: 2019-09-22 | End: 2019-09-22

## 2019-09-22 RX ORDER — HEPARIN SODIUM 5000 [USP'U]/ML
5000 INJECTION, SOLUTION INTRAVENOUS; SUBCUTANEOUS EVERY 8 HOURS
Status: DISCONTINUED | OUTPATIENT
Start: 2019-09-22 | End: 2019-09-24 | Stop reason: HOSPADM

## 2019-09-22 RX ORDER — ALPRAZOLAM 0.5 MG/1
0.5 TABLET ORAL
Status: DISCONTINUED | OUTPATIENT
Start: 2019-09-22 | End: 2019-09-24 | Stop reason: HOSPADM

## 2019-09-22 RX ORDER — BISACODYL 5 MG
5 TABLET, DELAYED RELEASE (ENTERIC COATED) ORAL DAILY PRN
Status: DISCONTINUED | OUTPATIENT
Start: 2019-09-22 | End: 2019-09-24 | Stop reason: HOSPADM

## 2019-09-22 RX ADMIN — HYDROCODONE BITARTRATE AND ACETAMINOPHEN 1 TABLET: 5; 325 TABLET ORAL at 20:28

## 2019-09-22 RX ADMIN — Medication 10 ML: at 20:30

## 2019-09-22 RX ADMIN — HYDRALAZINE HYDROCHLORIDE 10 MG: 20 INJECTION INTRAMUSCULAR; INTRAVENOUS at 20:29

## 2019-09-22 RX ADMIN — ACETAMINOPHEN 1000 MG: 500 TABLET ORAL at 08:59

## 2019-09-22 RX ADMIN — DIPHENHYDRAMINE HYDROCHLORIDE 25 MG: 50 INJECTION, SOLUTION INTRAMUSCULAR; INTRAVENOUS at 06:49

## 2019-09-22 RX ADMIN — Medication 10 ML: at 08:29

## 2019-09-22 RX ADMIN — HEPARIN SODIUM 5000 UNITS: 5000 INJECTION INTRAVENOUS; SUBCUTANEOUS at 13:21

## 2019-09-22 RX ADMIN — Medication 10 ML: at 13:25

## 2019-09-22 RX ADMIN — ACETAMINOPHEN 500 MG: 500 TABLET ORAL at 13:20

## 2019-09-22 RX ADMIN — SODIUM CHLORIDE 1000 ML: 900 INJECTION, SOLUTION INTRAVENOUS at 06:49

## 2019-09-22 RX ADMIN — LABETALOL HYDROCHLORIDE 10 MG: 5 INJECTION INTRAVENOUS at 08:55

## 2019-09-22 RX ADMIN — IOPAMIDOL 100 ML: 755 INJECTION, SOLUTION INTRAVENOUS at 08:29

## 2019-09-22 RX ADMIN — METOCLOPRAMIDE 5 MG: 5 INJECTION, SOLUTION INTRAMUSCULAR; INTRAVENOUS at 06:49

## 2019-09-22 RX ADMIN — HEPARIN SODIUM 5000 UNITS: 5000 INJECTION INTRAVENOUS; SUBCUTANEOUS at 20:32

## 2019-09-22 RX ADMIN — HYDROCODONE BITARTRATE AND ACETAMINOPHEN 1 TABLET: 5; 325 TABLET ORAL at 13:21

## 2019-09-22 NOTE — ROUTINE PROCESS
TRANSFER - OUT REPORT:    Verbal report given to PEBBLES Vila(name) on Clearance Lacy  being transferred to Ortho(unit) for routine progression of care       Report consisted of patients Situation, Background, Assessment and   Recommendations(SBAR). Information from the following report(s) SBAR, ED Summary, [facility] and Recent Results was reviewed with the receiving nurse. Lines:   Peripheral IV 09/22/19 Left Antecubital (Active)   Site Assessment Clean, dry, & intact 9/22/2019  6:43 AM   Phlebitis Assessment 0 9/22/2019  6:43 AM   Infiltration Assessment 0 9/22/2019  6:43 AM   Dressing Status Clean, dry, & intact 9/22/2019  6:43 AM   Dressing Type Transparent 9/22/2019  6:43 AM   Hub Color/Line Status Pink 9/22/2019  6:43 AM        Opportunity for questions and clarification was provided. Melvi Joaquin

## 2019-09-22 NOTE — ED TRIAGE NOTES
Pt states that since she started taking 2 new medications on Saturday, she has had headaches and started vomiting last night.

## 2019-09-22 NOTE — PROGRESS NOTES
Bedside and Verbal shift change report given to [facility] (oncoming nurse) by Randy Can (offgoing nurse). Report included the following information SBAR, Kardex, Intake/Output, MAR and Recent Results.

## 2019-09-22 NOTE — H&P
History and Physical          Pt Name  Luanne Alejandre   Date of Birth 1965   Medical Record Number  [id number]      Age  48 y.o. PCP Eric Hitchcock MD   Admit date:  9/22/2019    Room Number  PREETHI/PREETHI  @ [facility]   Date of Service  9/22/2019     Admission Diagnoses:  Hypertensive urgency      Certification: We are admitting Luanne Alejandre 48 y.o. female with a principle diagnosis of Hypertensive urgency  This patient also suffers from other comorbidities listed below. I have a high level of concern for recurrent sx  leading to life threatening complications      Assessment and plan:  Present on Admission:   Hypertensive urgency   Anxiety disorder   Cervical pain (neck)   Panic disorder with agoraphobia   Sjogren's syndrome (HCC)   SS-A antibody positive      Hypertensive urgency   Elevated troponin. -presenting with symptoms and signs of end-organ damage , proteinuria, elevated troponin, headache. This most likely precipitated by recent addition of PO Steroids + Indomethacin (Rxed for right hip pain) and not taking her usual long acting beta blocker at home   · Observe in remote tele bed   · Resume metoprolol as was PT   · STOP Steroids and NSAIDs   · Serial troponin   · Cardiologist evaluation   · PRN hydralazine IV for SBP >160mm hg   · Watch chemistry      Anxiety disorder   Hx of agarophobia -  · Pt takes xanax only as needed and last dose was several months ago   · We will have that mediciine available for her if that is needed. Sjognren's syndrome with SS-A abx +ve -  · No intervention needed at this time. Body mass index is 29.35 kg/m². -    ·        CODE STATUS  Full        Functional Status  Pt is  and lives with her  in [facility]. She does not smoke, drink ETOH or use recreational drugs   She works for [employer] moderate amount of physical activity required .    Surrogate decision maker: Pt's     Prophylaxis  Hep SQ   Discharge Plan: Home w/Family,    There are currently no Active Isolations Payor: FLORENCE REDD / Plan: [address] [place] [address] / Product Type: PPO /    Social issues  Date Comment    09/22/19  12:35 PM No family or visitor here with the patient today         Prognosis  Good      Subjective Data         History of Present Illness : The pt went to her PCP two days ago for persistent RIGHT hip area pain. Xrays were done and pt received Rx for Indomethacin + medrol dose pack. She started the steroid that same date and started the NSAID yesterday. By the evening yesterday, she developed persistent generalized headache that didn't relent. Then she developed nausea and vomiting. This was around 9pm when she vomited first time with near resolution of her headache. At that time she did feel some precordial pain and tightness. She has similar at least three cycles of above issues that didn't get better through the night. She came to ER early this morning.       Review of Systems - History obtained from the patient  General ROS: negative for - chills, fatigue or fever  Psychological ROS: positive for - anxiety  Ophthalmic ROS: negative  Respiratory ROS: no cough, shortness of breath, or wheezing  Cardiovascular ROS: positive for - chest pain  negative for - dyspnea on exertion, edema or irregular heartbeat  Gastrointestinal ROS: positive for - nausea/vomiting  negative for - appetite loss or blood in stools  Genito-Urinary ROS: no dysuria, trouble voiding, or hematuria  Musculoskeletal ROS: positive for - Right hip pain   Neurological ROS: positive for - headaches  negative for - confusion or dizziness  ROS       Past Medical History:   Diagnosis Date    Anxiety disorder 4/25/2018    Cervical pain (neck) 1/27/2015    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    Elevated BP 6/6/2016    Elevated heart rate and blood pressure 6/30/2016    HTN, goal below 140/90 9/21/2017    Panic attack as reaction to stress 6/30/2016    Panic disorder with agoraphobia 2016    Right shoulder pain 2015    Thyroid nodule          Past Surgical History:   Procedure Laterality Date    HX GYN      HX HYSTERECTOMY  2012    THYROIDECTOMY  2002         Social History     Tobacco Use    Smoking status: Never Smoker    Smokeless tobacco: Never Used   Substance Use Topics    Alcohol use: No         Family History   Problem Relation Age of Onset    Hypertension Mother     Hypertension Sister        Father  from Brain tumor at 52 years age. Mother in her [de-identified] - early signs of dementia        Objective data     Comment:  pleasant lady lying in bed in no distress      Patient Vitals for the past 24 hrs:   Temp   19 1132 99.4 °F (37.4 °C)   19 0558 97.2 °F (36.2 °C)      Patient Vitals for the past 24 hrs:   Pulse   19 1132 75   19 1115 67   19 1045 68   19 0957 67   19 0848 67   19 0800 88   19 0558 74      Patient Vitals for the past 24 hrs:   Resp   19 1132 19   19 1115 24   19 1045 18   19 0957 16   19 0848 16   19 0558 17      Patient Vitals for the past 24 hrs:   BP   19 1132 (!) 160/91   19 1115 (!) 174/91   19 1045 (!) 163/93   19 1015 (!) 169/94   19 0957 156/81   19 0900 154/89   19 0848 186/89   19 0800 186/89   19 0745 (!) 215/64   19 0715 162/82   19 0558 (!) 201/110        SpO2 Readings from Last 6 Encounters:   19 100%   19 100%   18 100%   18 100%   17 100%   17 100%         O2 Device: Room air   Body mass index is 29.35 kg/m².  - Wt Readings from Last 10 Encounters:   19 72.8 kg (160 lb 7.9 oz)   19 75.1 kg (165 lb 9.6 oz)   18 67.5 kg (148 lb 12.8 oz)   18 67 kg (147 lb 12.8 oz)   18 67.1 kg (147 lb 14.9 oz)   17 73.5 kg (162 lb)   17 73 kg (161 lb)   16 73.2 kg (161 lb 6.4 oz)   16 71.8 kg (158 lb 4.6 oz)   06/30/16 71.1 kg (156 lb 12.8 oz)          Physical Exam:             General:  Alert, cooperative,   well noursished,   well developed,   appears stated age    Ears/Eyes:  Hearing intact  Sclera anicteric. Pupils equal   Mouth/Throat:  Mucous membranes normal pink and moist  Oral pharynx clear    Neck:     Lungs:  Trachea midline  Chest excursion symmetrical   Auscultation B/L Symmetrical with   Vesicular breath sounds     No Crepitations noted   Percussion note resonant on mid Clavicular line; no sign of pneumothorax        CVS:  Regular rate and rhythm   no  murmur,   No click, rub or gallop  S1 normal   S2 normal   Pedal pulses  b/l symmetrical   Abdomen:  Obese  Soft, non-tender  Bowel sounds normal  No distension   Percussion note tympanitic   Extremities:    No cyanosis, jaundice  No edema noted   No sign of DVT/cord like lesion on palpation  No sign of acute trauma   Age appropriate OA changes noted. Skin:    Skin color, texture, turgor normal. no acute rash or lesions    Lymph nodes:     Musculoskeletal Muscle bulk B/L symmetrical   Neuro Cranial nerves are intact,   motor movement b/l symmetrical,   Sensory evaluation b/l symmetrical    Psych:  Alert and oriented, normal mood & affect given the clinical scenario          Medications reviewed     Current Facility-Administered Medications   Medication Dose Route Frequency    ketorolac (TORADOL) injection 15 mg  15 mg IntraVENous NOW    acetaminophen (TYLENOL) tablet 650 mg  650 mg Oral Q6H PRN    ondansetron (ZOFRAN) injection 4 mg  4 mg IntraVENous Q4H PRN     Current Outpatient Medications   Medication Sig    ALPRAZolam (XANAX) 0.5 mg tablet Take 1 Tab by mouth nightly as needed for Anxiety or Sleep. Max Daily Amount: 0.5 mg.    indomethacin (INDOCIN) 50 mg capsule Take 1 Cap by mouth three (3) times daily for 6 days.     methylPREDNISolone (MEDROL DOSEPACK) 4 mg tablet As directed for 6 days    metoprolol succinate (TOPROL-XL) 50 mg XL tablet Take 1 Tab by mouth nightly. Relevant other informations: Other medical conditions listed in this following active hospital problem list section; all of these and other pertinent data were taken into consideration when treatment plan is developed and customized to this patient's unique overall circumstances and needs. We have reviewed available old medical records within the constraints of this admission process. Present on Admission:   Hypertensive urgency   Anxiety disorder   Cervical pain (neck)   Panic disorder with agoraphobia   Sjogren's syndrome (HCC)   SS-A antibody positive       Data Review:   Recent Days:  All Micro Results     None          Recent Labs     09/22/19  0642 09/20/19  1619   WBC 9.2 4.9   HGB 14.2 13.9   HCT 41.6 40.8    283     Recent Labs     09/22/19  0642 09/20/19  1619    146*   K 3.7 4.4   * 106   CO2 25 22   * 97   BUN 13 14   CREA 1.07* 0.99   CA 9.5 10.0   ALB 4.6 5.0   TBILI 0.3 0.3   SGOT 20 23   ALT 35 27      Lab Results   Component Value Date/Time    TSH 1.960 09/20/2019 04:19 PM         Care Plan discussed with: Patient/Family and Nurse   Other medical conditions are listed in the active hospital problem list section; these and other pertinent data were taken into consideration when the treatment plan was developed and customized to this patient's unique overall circumstances and needs. High complexity decision making was performed for this patient who is at high risk for decompensation with multiple organ involvement. Today total floor/unit time was 45  minutes while caring for this patient and greater than 50% of that time was spent with patient (and/or family) coordinating patients clinical issues.      Rashmi Balbuena MD MPH  FACP                               9/22/2019       __________________________________________________________   FOR SOUND PHYSICIAN ADMINISTRATIVE USE ONLY   MDM        Katrin Gregory MD MPH FACP   12:22 PM  9/22/2019

## 2019-09-22 NOTE — PROGRESS NOTES
Dr. Miladis Aguilar notified for cardiology consult for elevated troponin in light of hypertensive emergency. Call back number [phone] given.

## 2019-09-22 NOTE — ED PROVIDER NOTES
EMERGENCY DEPARTMENT HISTORY AND PHYSICAL EXAM      Date: 9/22/2019  Patient Name: Deborah Macdonald    History of Presenting Illness     Chief Complaint   Patient presents with    Headache    Vomiting       History Provided By: Patient and Patient's     HPI: Deborah Macdonald, 48 y.o. female with history of hypertension, anxiety, depression presents to the ED with cc of headache, nausea, vomiting. Symptoms started yesterday evening around 9:51 PM.  Patient was woken up with her head pounding. Headache described as a pounding sensation which waxes and wanes located to the front of her head and radiates around to the back of her head on both sides. Symptoms have been persistent and so she presents to the ED now. She has had nausea with vomiting. She denies any visual changes, photophobia. She does not have a history of headaches. No recent illness. Of note patient has had right hip and right leg pain and was recently started on Medrol Dosepak and indomethacin by her PCP. She has been compliant with her metoprolol. He does not check her blood pressure at home but states that when she goes to the doctor it is typically around 146 systolic. She denies any chest pain or shortness of breath. There are no other complaints, changes, or physical findings at this time. PCP: Ramos Frank MD    No current facility-administered medications on file prior to encounter. Current Outpatient Medications on File Prior to Encounter   Medication Sig Dispense Refill    ALPRAZolam (XANAX) 0.5 mg tablet Take 1 Tab by mouth nightly as needed for Anxiety or Sleep. Max Daily Amount: 0.5 mg. 30 Tab 2    indomethacin (INDOCIN) 50 mg capsule Take 1 Cap by mouth three (3) times daily for 6 days. 18 Cap 0    methylPREDNISolone (MEDROL DOSEPACK) 4 mg tablet As directed for 6 days 1 Dose Pack 0    metoprolol succinate (TOPROL-XL) 50 mg XL tablet Take 1 Tab by mouth nightly.  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    Elevated BP 6/6/2016    Elevated heart rate and blood pressure 6/30/2016    HTN, goal below 140/90 9/21/2017    Panic attack as reaction to stress 6/30/2016    Panic disorder with agoraphobia 6/30/2016    Right shoulder pain 1/27/2015    Thyroid nodule        Past Surgical History:  Past Surgical History:   Procedure Laterality Date    HX GYN      HX HYSTERECTOMY  2012    THYROIDECTOMY  2002       Family History:  Family History   Problem Relation Age of Onset    Hypertension Mother     Hypertension Sister        Social History:  Social History     Tobacco Use    Smoking status: Never Smoker    Smokeless tobacco: Never Used   Substance Use Topics    Alcohol use: No    Drug use: No       Allergies:  No Known Allergies      Review of Systems   Review of Systems   Constitutional: Negative for chills and fever. HENT: Negative. Eyes: Negative for photophobia and visual disturbance. Respiratory: Negative for cough and shortness of breath. Cardiovascular: Negative for chest pain and leg swelling. Gastrointestinal: Positive for nausea and vomiting. Negative for abdominal pain. Genitourinary: Negative. Musculoskeletal: Negative for back pain and gait problem. Skin: Negative for color change and rash. Neurological: Positive for headaches. Negative for dizziness, weakness and light-headedness. Hematological: Does not bruise/bleed easily. Physical Exam   Physical Exam   Constitutional: She is oriented to person, place, and time. She appears well-developed and well-nourished. No distress. HENT:   Head: Normocephalic and atraumatic. Eyes: Pupils are equal, round, and reactive to light. EOM are normal.   Neck: Normal range of motion. Neck supple. No JVD present.    Cardiovascular: Normal rate, regular rhythm and normal heart sounds. No murmur heard. Pulmonary/Chest: Effort normal and breath sounds normal. No respiratory distress. She has no wheezes. Abdominal: Soft. She exhibits no distension. There is no tenderness. There is no rebound. Musculoskeletal: Normal range of motion. She exhibits no edema. Neurological: She is alert and oriented to person, place, and time. Cranial nerves intact, motor 5/5 throughout, no pronator drift. Sensation intact. No cerebellar deficits. Ambulatory in ED without difficulty. Skin: Skin is warm and dry. She is not diaphoretic. No erythema. Diagnostic Study Results     Labs -     Recent Results (from the past 12 hour(s))   CBC WITH AUTOMATED DIFF    Collection Time: 09/22/19  6:42 AM   Result Value Ref Range    WBC 9.2 3.6 - 11.0 K/uL    RBC 4.74 3.80 - 5.20 M/uL    HGB 14.2 11.5 - 16.0 g/dL    HCT 41.6 35.0 - 47.0 %    MCV 87.8 80.0 - 99.0 FL    MCH 30.0 26.0 - 34.0 PG    MCHC 34.1 30.0 - 36.5 g/dL    RDW 13.2 11.5 - 14.5 %    PLATELET 684 979 - 908 K/uL    MPV 10.0 8.9 - 12.9 FL    NRBC 0.0 0  WBC    ABSOLUTE NRBC 0.00 0.00 - 0.01 K/uL    NEUTROPHILS 84 (H) 32 - 75 %    LYMPHOCYTES 9 (L) 12 - 49 %    MONOCYTES 7 5 - 13 %    EOSINOPHILS 0 0 - 7 %    BASOPHILS 0 0 - 1 %    IMMATURE GRANULOCYTES 0 0.0 - 0.5 %    ABS. NEUTROPHILS 7.7 1.8 - 8.0 K/UL    ABS. LYMPHOCYTES 0.9 0.8 - 3.5 K/UL    ABS. MONOCYTES 0.6 0.0 - 1.0 K/UL    ABS. EOSINOPHILS 0.0 0.0 - 0.4 K/UL    ABS. BASOPHILS 0.0 0.0 - 0.1 K/UL    ABS. IMM.  GRANS. 0.0 0.00 - 0.04 K/UL    DF AUTOMATED     METABOLIC PANEL, COMPREHENSIVE    Collection Time: 09/22/19  6:42 AM   Result Value Ref Range    Sodium 144 136 - 145 mmol/L    Potassium 3.7 3.5 - 5.1 mmol/L    Chloride 111 (H) 97 - 108 mmol/L    CO2 25 21 - 32 mmol/L    Anion gap 8 5 - 15 mmol/L    Glucose 210 (H) 65 - 100 mg/dL    BUN 13 6 - 20 MG/DL    Creatinine 1.07 (H) 0.55 - 1.02 MG/DL    BUN/Creatinine ratio 12 12 - 20      GFR est AA >60 >60 ml/min/1.73m2    GFR est non-AA 54 (L) >60 ml/min/1.73m2    Calcium 9.5 8.5 - 10.1 MG/DL    Bilirubin, total 0.3 0.2 - 1.0 MG/DL    ALT (SGPT) 35 12 - 78 U/L    AST (SGOT) 20 15 - 37 U/L    Alk. phosphatase 99 45 - 117 U/L    Protein, total 8.6 (H) 6.4 - 8.2 g/dL    Albumin 4.6 3.5 - 5.0 g/dL    Globulin 4.0 2.0 - 4.0 g/dL    A-G Ratio 1.2 1.1 - 2.2     TROPONIN I    Collection Time: 09/22/19  6:42 AM   Result Value Ref Range    Troponin-I, Qt. 0.16 (H) <0.05 ng/mL   TSH 3RD GENERATION    Collection Time: 09/22/19  6:42 AM   Result Value Ref Range    TSH 0.72 0.36 - 3.74 uIU/mL   EKG, 12 LEAD, INITIAL    Collection Time: 09/22/19  7:46 AM   Result Value Ref Range    Ventricular Rate 79 BPM    Atrial Rate 79 BPM    P-R Interval 136 ms    QRS Duration 74 ms    Q-T Interval 374 ms    QTC Calculation (Bezet) 428 ms    Calculated R Axis 25 degrees    Calculated T Axis 16 degrees    Diagnosis       Normal sinus rhythm with sinus arrhythmia  Nonspecific ST and T wave abnormality  When compared with ECG of 02-JUL-2016 10:35,  ST now depressed in Anterior leads     URINALYSIS W/ REFLEX CULTURE    Collection Time: 09/22/19  8:49 AM   Result Value Ref Range    Color YELLOW/STRAW      Appearance CLEAR CLEAR      Specific gravity 1.024 1.003 - 1.030      pH (UA) 7.0 5.0 - 8.0      Protein TRACE (A) NEG mg/dL    Glucose NEGATIVE  NEG mg/dL    Ketone NEGATIVE  NEG mg/dL    Bilirubin NEGATIVE  NEG      Blood TRACE (A) NEG      Urobilinogen 0.2 0.2 - 1.0 EU/dL    Nitrites NEGATIVE  NEG      Leukocyte Esterase NEGATIVE  NEG      WBC 0-4 0 - 4 /hpf    RBC 0-5 0 - 5 /hpf    Epithelial cells FEW FEW /lpf    Bacteria NEGATIVE  NEG /hpf    UA:UC IF INDICATED CULTURE NOT INDICATED BY UA RESULT CNI      Hyaline cast 0-2 0 - 5 /lpf       Radiologic Studies -   CT HEAD WO CONT   Final Result   IMPRESSION: No acute abnormality identified            CTA HEAD   Final Result   IMPRESSION: Normal CTA of the head.         CT Results  (Last 48 hours) 09/22/19 0829  CT HEAD WO CONT Final result    Impression:  IMPRESSION: No acute abnormality identified               Narrative:  EXAM: CT HEAD WO CONT       INDICATION: atypical headache, high blood pressure       COMPARISON: None. CONTRAST: None. TECHNIQUE: Unenhanced CT of the head was performed using 5 mm images. Brain and   bone windows were generated. CT dose reduction was achieved through use of a   standardized protocol tailored for this examination and automatic exposure   control for dose modulation. FINDINGS:   The ventricles and sulci are normal in size, shape and configuration and   midline. There is no significant white matter disease. There is no intracranial   hemorrhage, extra-axial collection, mass, mass effect or midline shift. The   basilar cisterns are open. No acute infarct is identified. The bone windows   demonstrate no abnormalities. The visualized portions of the paranasal sinuses   and mastoid air cells are clear. 09/22/19 0829  CTA HEAD Final result    Impression:  IMPRESSION: Normal CTA of the head. Narrative:  EXAM:  CTA HEAD   INDICATION:  atypical headache, worst of life - r/o aneurysm, vomiting. PROCEDURE:   Thin high resolution spiral axial images were obtained from the lower skull base   through the mid calvarium for CT angiography. This was performed during the   rapid bolus infusion of 100 mL Isovue 370. Post processing with MIP   reconstructions were created in sagittal and coronal plane. Additional   postprocessing with MIP and 3-D reconstructions were created. CT dose reduction   was achieved through use of a standardized protocol tailored for this   examination and automatic exposure control for dose modulation. COMPARISON: CT head   FINDINGS:   The distal internal carotid arteries are symmetric and normal. There is   symmetric opacification of middle and anterior cerebral arteries.  Vertebral   arteries both supply the basilar artery and the left vertebral artery is   dominant. The basilar artery is normal and the dominant supply to the right   posterior cerebral artery with a small posterior communicating artery. There is   a small pretracheal communicating left P1 segment supplying the left posterior   cerebral artery with primarily supplied from a fetal origin off of the left   internal carotid artery. No evidence of aneurysm, vascular malformation, occlusion, intraluminal filling   defect or other acute arterial abnormality. No abnormal intracranial enhancement   demonstrated. CXR Results  (Last 48 hours)    None          Medical Decision Making   I am the first provider for this patient. I reviewed the vital signs, available nursing notes, past medical history, past surgical history, family history and social history. Vital Signs-Reviewed the patient's vital signs. Patient Vitals for the past 12 hrs:   Temp Pulse Resp BP SpO2   09/22/19 1421 98 °F (36.7 °C) 70 18 164/88 99 %   09/22/19 1245 98.7 °F (37.1 °C) 69 18 (!) 170/96 100 %   09/22/19 1132 99.4 °F (37.4 °C) 75 19 (!) 160/91 100 %   09/22/19 1115 -- 67 24 (!) 174/91 98 %   09/22/19 1045 -- 68 18 (!) 163/93 98 %   09/22/19 1015 -- -- -- (!) 169/94 96 %   09/22/19 0957 -- 67 16 156/81 98 %   09/22/19 0902 -- -- -- -- 100 %   09/22/19 0900 -- -- -- 154/89 --   09/22/19 0848 -- 67 16 186/89 100 %   09/22/19 0803 -- -- -- -- 98 %   09/22/19 0800 -- 88 -- 186/89 --   09/22/19 0745 -- -- -- (!) 215/64 --   09/22/19 0731 -- -- -- -- 97 %   09/22/19 0717 -- -- -- -- 93 %   09/22/19 0715 -- -- -- 162/82 --   09/22/19 0558 97.2 °F (36.2 °C) 74 17 (!) 201/110 99 %     Records Reviewed: Nursing Notes, Old Medical Records, Previous Radiology Studies and Previous Laboratory Studies    Provider Notes (Medical Decision Making): This is a 31-year-old female here with hypertensive urgency presenting with headaches, nausea, vomiting.   On examination she has no focal neurologic deficits. She has significant hypertension with . EKG unremarkable. Given her significant headache I did obtain CT head with CT angiogram and this did not demonstrate any acute blood in the subarachnoid space or any concern for aneurysm. I did offer LP however the patient's symptoms have already improved after reduction of blood pressure with use of labetalol. I also gave Tylenol, Toradol, Reglan, Benadryl and she does feel improved. She would like to defer LP at this time. Her troponin is significantly elevated at this time however at 0.15 she does not have any chest pain or shortness of breath. Her urinalysis also demonstrates some proteinuria and trace hematuria. At this time I do have concern for endorgan damage secondary to hypertensive urgency. I discussed with Dr. Basim Caceres, as well as, who will accept for admission. ED Course:   Initial assessment performed. The patients presenting problems have been discussed, and they are in agreement with the care plan formulated and outlined with them. I have encouraged them to ask questions as they arise throughout their visit. ED Course as of Sep 22 1526   Sun Sep 22, 2019   0753 EKG per my interpretation normal sinus rhythm, rate 79 beats minute, normal axis, no acute ischemic changes. [AK]      ED Course User Index  [AK] Viral Castro MD       Critical Care Time:   0    Disposition:  Home      Diagnosis     Clinical Impression:   1. Hypertensive urgency    2. Elevated troponin        Attestations:    Tess Giron MD    Please note that this dictation was completed with [employer], the computer voice recognition software. Quite often unanticipated grammatical, syntax, homophones, and other interpretive errors are inadvertently transcribed by the computer software. Please disregard these errors. Please excuse any errors that have escaped final proofreading. Thank you.

## 2019-09-23 ENCOUNTER — APPOINTMENT (OUTPATIENT)
Dept: NON INVASIVE DIAGNOSTICS | Age: 54
End: 2019-09-23
Attending: INTERNAL MEDICINE
Payer: COMMERCIAL

## 2019-09-23 LAB
ANION GAP SERPL CALC-SCNC: 5 MMOL/L (ref 5–15)
ATRIAL RATE: 79 BPM
BASOPHILS # BLD: 0 K/UL (ref 0–0.1)
BASOPHILS NFR BLD: 0 % (ref 0–1)
BUN SERPL-MCNC: 16 MG/DL (ref 6–20)
BUN/CREAT SERPL: 20 (ref 12–20)
CALCIUM SERPL-MCNC: 9.2 MG/DL (ref 8.5–10.1)
CALCULATED R AXIS, ECG10: 25 DEGREES
CALCULATED T AXIS, ECG11: 16 DEGREES
CHLORIDE SERPL-SCNC: 108 MMOL/L (ref 97–108)
CO2 SERPL-SCNC: 25 MMOL/L (ref 21–32)
CREAT SERPL-MCNC: 0.8 MG/DL (ref 0.55–1.02)
DIAGNOSIS, 93000: NORMAL
DIFFERENTIAL METHOD BLD: NORMAL
ECHO AV AREA PEAK VELOCITY: 2.6 CM2
ECHO AV AREA/BSA PEAK VELOCITY: 1.4 CM2/M2
ECHO AV PEAK GRADIENT: 12.6 MMHG
ECHO AV PEAK VELOCITY: 177.38 CM/S
ECHO EST RA PRESSURE: 10 MMHG
ECHO LA AREA 4C: 14.2 CM2
ECHO LA MAJOR AXIS: 2.7 CM
ECHO LA VOL 4C: 32.58 ML (ref 22–52)
ECHO LA VOLUME INDEX A4C: 18.74 ML/M2 (ref 16–28)
ECHO LV E' LATERAL VELOCITY: 8.63 CM/S
ECHO LV E' SEPTAL VELOCITY: 5.79 CM/S
ECHO LV INTERNAL DIMENSION DIASTOLIC: 4 CM (ref 3.9–5.3)
ECHO LV INTERNAL DIMENSION SYSTOLIC: 2.02 CM
ECHO LV IVSD: 1.48 CM (ref 0.6–0.9)
ECHO LV MASS 2D: 272.5 G (ref 67–162)
ECHO LV MASS INDEX 2D: 156.7 G/M2 (ref 43–95)
ECHO LV POSTERIOR WALL DIASTOLIC: 1.49 CM (ref 0.6–0.9)
ECHO LVOT DIAM: 1.95 CM
ECHO LVOT PEAK GRADIENT: 9.4 MMHG
ECHO LVOT PEAK VELOCITY: 153.13 CM/S
ECHO MV A VELOCITY: 100.23 CM/S
ECHO MV E DECELERATION TIME (DT): 320.9 MS
ECHO MV E VELOCITY: 71.3 CM/S
ECHO MV E/A RATIO: 0.71
ECHO MV E/E' LATERAL: 8.26
ECHO MV E/E' RATIO (AVERAGED): 10.29
ECHO MV E/E' SEPTAL: 12.31
ECHO MV REGURGITANT PEAK GRADIENT: 10.4 MMHG
ECHO MV REGURGITANT PEAK VELOCITY: 161.05 CM/S
ECHO PULMONARY ARTERY SYSTOLIC PRESSURE (PASP): 16.9 MMHG
ECHO RIGHT VENTRICULAR SYSTOLIC PRESSURE (RVSP): 16.9 MMHG
ECHO TV REGURGITANT MAX VELOCITY: 130.95 CM/S
ECHO TV REGURGITANT PEAK GRADIENT: 6.9 MMHG
EOSINOPHIL # BLD: 0 K/UL (ref 0–0.4)
EOSINOPHIL NFR BLD: 0 % (ref 0–7)
ERYTHROCYTE [DISTWIDTH] IN BLOOD BY AUTOMATED COUNT: 13.4 % (ref 11.5–14.5)
GLUCOSE SERPL-MCNC: 112 MG/DL (ref 65–100)
HCT VFR BLD AUTO: 41.2 % (ref 35–47)
HGB BLD-MCNC: 13.5 G/DL (ref 11.5–16)
IMM GRANULOCYTES # BLD AUTO: 0 K/UL (ref 0–0.04)
IMM GRANULOCYTES NFR BLD AUTO: 0 % (ref 0–0.5)
LYMPHOCYTES # BLD: 1.4 K/UL (ref 0.8–3.5)
LYMPHOCYTES NFR BLD: 21 % (ref 12–49)
MCH RBC QN AUTO: 29.3 PG (ref 26–34)
MCHC RBC AUTO-ENTMCNC: 32.8 G/DL (ref 30–36.5)
MCV RBC AUTO: 89.4 FL (ref 80–99)
MONOCYTES # BLD: 0.5 K/UL (ref 0–1)
MONOCYTES NFR BLD: 7 % (ref 5–13)
NEUTS SEG # BLD: 4.8 K/UL (ref 1.8–8)
NEUTS SEG NFR BLD: 72 % (ref 32–75)
NRBC # BLD: 0 K/UL (ref 0–0.01)
NRBC BLD-RTO: 0 PER 100 WBC
P-R INTERVAL, ECG05: 136 MS
PLATELET # BLD AUTO: 244 K/UL (ref 150–400)
PMV BLD AUTO: 9.8 FL (ref 8.9–12.9)
POTASSIUM SERPL-SCNC: 3.8 MMOL/L (ref 3.5–5.1)
Q-T INTERVAL, ECG07: 374 MS
QRS DURATION, ECG06: 74 MS
QTC CALCULATION (BEZET), ECG08: 428 MS
RBC # BLD AUTO: 4.61 M/UL (ref 3.8–5.2)
SODIUM SERPL-SCNC: 138 MMOL/L (ref 136–145)
TROPONIN I SERPL-MCNC: <0.05 NG/ML
TROPONIN I SERPL-MCNC: <0.05 NG/ML
VENTRICULAR RATE, ECG03: 79 BPM
WBC # BLD AUTO: 6.7 K/UL (ref 3.6–11)

## 2019-09-23 PROCEDURE — 85025 COMPLETE CBC W/AUTO DIFF WBC: CPT

## 2019-09-23 PROCEDURE — 80048 BASIC METABOLIC PNL TOTAL CA: CPT

## 2019-09-23 PROCEDURE — 94760 N-INVAS EAR/PLS OXIMETRY 1: CPT

## 2019-09-23 PROCEDURE — 96376 TX/PRO/DX INJ SAME DRUG ADON: CPT

## 2019-09-23 PROCEDURE — 74011250637 HC RX REV CODE- 250/637: Performed by: INTERNAL MEDICINE

## 2019-09-23 PROCEDURE — 74011250636 HC RX REV CODE- 250/636: Performed by: INTERNAL MEDICINE

## 2019-09-23 PROCEDURE — 96372 THER/PROPH/DIAG INJ SC/IM: CPT

## 2019-09-23 PROCEDURE — 99218 HC RM OBSERVATION: CPT

## 2019-09-23 PROCEDURE — 51798 US URINE CAPACITY MEASURE: CPT

## 2019-09-23 PROCEDURE — 93306 TTE W/DOPPLER COMPLETE: CPT

## 2019-09-23 PROCEDURE — 36415 COLL VENOUS BLD VENIPUNCTURE: CPT

## 2019-09-23 PROCEDURE — 84484 ASSAY OF TROPONIN QUANT: CPT

## 2019-09-23 RX ORDER — HYDROCHLOROTHIAZIDE 25 MG/1
12.5 TABLET ORAL DAILY
Status: DISCONTINUED | OUTPATIENT
Start: 2019-09-23 | End: 2019-09-24 | Stop reason: HOSPADM

## 2019-09-23 RX ADMIN — HEPARIN SODIUM 5000 UNITS: 5000 INJECTION INTRAVENOUS; SUBCUTANEOUS at 05:34

## 2019-09-23 RX ADMIN — HYDROCODONE BITARTRATE AND ACETAMINOPHEN 1 TABLET: 5; 325 TABLET ORAL at 05:40

## 2019-09-23 RX ADMIN — Medication 10 ML: at 14:14

## 2019-09-23 RX ADMIN — HYDROCODONE BITARTRATE AND ACETAMINOPHEN 1 TABLET: 5; 325 TABLET ORAL at 11:46

## 2019-09-23 RX ADMIN — Medication 10 ML: at 05:34

## 2019-09-23 RX ADMIN — HYDROCODONE BITARTRATE AND ACETAMINOPHEN 1 TABLET: 5; 325 TABLET ORAL at 18:36

## 2019-09-23 RX ADMIN — HYDROCHLOROTHIAZIDE 12.5 MG: 25 TABLET ORAL at 10:38

## 2019-09-23 RX ADMIN — HEPARIN SODIUM 5000 UNITS: 5000 INJECTION INTRAVENOUS; SUBCUTANEOUS at 21:13

## 2019-09-23 RX ADMIN — HYDRALAZINE HYDROCHLORIDE 10 MG: 20 INJECTION INTRAMUSCULAR; INTRAVENOUS at 11:46

## 2019-09-23 RX ADMIN — Medication 10 ML: at 21:16

## 2019-09-23 RX ADMIN — METOPROLOL SUCCINATE 50 MG: 50 TABLET, EXTENDED RELEASE ORAL at 21:13

## 2019-09-23 RX ADMIN — HEPARIN SODIUM 5000 UNITS: 5000 INJECTION INTRAVENOUS; SUBCUTANEOUS at 12:38

## 2019-09-23 NOTE — CONSULTS
Subjective:      Date of  Admission: 9/22/2019  6:02 AM     Admission type:Emergency    Leita Gitelman is a 48 y.o. female admitted for Hypertensive urgency [I16.0]. She presented with severe headache. Noted to have sever HTN. Recently given indomethacin and steroid for hip pain. She denies chest pain, chest pressure/discomfort, dyspnea, palpitations, irregular heart beats, near-syncope, syncope, fatigue, orthopnea, paroxysmal nocturnal dyspnea, exertional chest pressure/discomfort, claudication, lower extremity edema, tachypnea.      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    HTN, goal below 140/90 09/21/2017    Panic attack as reaction to stress 06/30/2016    Elevated heart rate and blood pressure 06/30/2016    Panic disorder with agoraphobia 06/30/2016    Elevated BP 06/06/2016    Right shoulder pain 01/27/2015    Cervical pain (neck) 01/27/2015    Sjogren's syndrome ([facility] [address] 03/13/2014    SS-A antibody positive 03/13/2014    Knee pain, bilateral 03/18/2013    Skin tags 03/30/2011    Lateral epicondylitis  of elbow 03/30/2011      Sree Spencer MD  Past Medical History:   Diagnosis Date    Anxiety disorder 4/25/2018    Cervical pain (neck) 1/27/2015    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    Elevated BP 6/6/2016    Elevated heart rate and blood pressure 6/30/2016    HTN, goal below 140/90 9/21/2017    Panic attack as reaction to stress 6/30/2016    Panic disorder with agoraphobia 6/30/2016    Right shoulder pain 1/27/2015    Thyroid nodule       Past Surgical History:   Procedure Laterality Date    HX GYN      HX HYSTERECTOMY  2012    THYROIDECTOMY  2002     No Known Allergies   Family History   Problem Relation Age of Onset    Hypertension Mother     Hypertension Sister       Current Facility-Administered Medications   Medication Dose Route Frequency    hydroCHLOROthiazide (HYDRODIURIL) tablet 12.5 mg  12.5 mg Oral DAILY    ALPRAZolam (XANAX) tablet 0.5 mg  0.5 mg Oral QHS PRN    metoprolol succinate (TOPROL-XL) XL tablet 50 mg  50 mg Oral QHS    sodium chloride (NS) flush 5-40 mL  5-40 mL IntraVENous Q8H    sodium chloride (NS) flush 5-40 mL  5-40 mL IntraVENous PRN    acetaminophen (TYLENOL) tablet 500 mg  500 mg Oral Q4H PRN    HYDROcodone-acetaminophen (NORCO) 5-325 mg per tablet 1 Tab  1 Tab Oral Q6H PRN    naloxone (NARCAN) injection 0.4 mg  0.4 mg IntraVENous PRN    ondansetron (ZOFRAN) injection 2 mg  2 mg IntraVENous Q6H PRN    bisacodyl (DULCOLAX) tablet 5 mg  5 mg Oral DAILY PRN    heparin (porcine) injection 5,000 Units  5,000 Units SubCUTAneous Q8H    hydrALAZINE (APRESOLINE) 20 mg/mL injection 10 mg  10 mg IntraVENous Q6H PRN         Review of Symptoms:  Constitutional: negative  Eyes: negative  Ears, nose, mouth, throat, and face: negative  Respiratory: No exertional dyspnea, orthopnea, PND, cough, hemoptysis, URI. Cardiovascular: No CP, palpitations, sweating, lightheadedness, dizziness, syncope, presyncope, lower extremity swelling. Gastrointestinal: No nausea, vomiting, diarrhea, constipation, abdominal pain, hematemesis, melena, hematochezia  Genitourinary:No urinary complaints. Musculoskeletal:negative  Neurological: as above otherwise negative  Behvioral/Psych: negative  Endocrine: negative     Subjective:      Visit Vitals  /89 (BP 1 Location: Right arm)   Pulse 65   Temp 98.9 °F (37.2 °C)   Resp 18   Ht 5' 2\" (1.575 m)   Wt 160 lb 7.9 oz (72.8 kg)   SpO2 98%   Breastfeeding? No   BMI 29.35 kg/m²       Physical Exam  Abdomen: soft, non-tender.  Bowel sounds normal.   Extremities: no cyanosis or edema  Heart: regular rate and rhythm, S1, S2 normal, no murmur, click, rub or gallop  Lungs: clear to auscultation bilaterally  Neck: supple, no carotid bruit and no JVD  Neurologic: Grossly normal  Pulses: 2+ Cardiographics    Telemetry: normal sinus rhythm    ECG: SR, non specific T wave changes    Echocardiogram: Not done    Labs:   Recent Results (from the past 24 hour(s))   TROPONIN I    Collection Time: 09/22/19  3:50 PM   Result Value Ref Range    Troponin-I, Qt. 0.12 (H) <0.05 ng/mL   TROPONIN I    Collection Time: 09/23/19 12:56 AM   Result Value Ref Range    Troponin-I, Qt. <0.05 <8.94 ng/mL   METABOLIC PANEL, BASIC    Collection Time: 09/23/19  6:28 AM   Result Value Ref Range    Sodium 138 136 - 145 mmol/L    Potassium 3.8 3.5 - 5.1 mmol/L    Chloride 108 97 - 108 mmol/L    CO2 25 21 - 32 mmol/L    Anion gap 5 5 - 15 mmol/L    Glucose 112 (H) 65 - 100 mg/dL    BUN 16 6 - 20 MG/DL    Creatinine 0.80 0.55 - 1.02 MG/DL    BUN/Creatinine ratio 20 12 - 20      GFR est AA >60 >60 ml/min/1.73m2    GFR est non-AA >60 >60 ml/min/1.73m2    Calcium 9.2 8.5 - 10.1 MG/DL   CBC WITH AUTOMATED DIFF    Collection Time: 09/23/19  6:28 AM   Result Value Ref Range    WBC 6.7 3.6 - 11.0 K/uL    RBC 4.61 3.80 - 5.20 M/uL    HGB 13.5 11.5 - 16.0 g/dL    HCT 41.2 35.0 - 47.0 %    MCV 89.4 80.0 - 99.0 FL    MCH 29.3 26.0 - 34.0 PG    MCHC 32.8 30.0 - 36.5 g/dL    RDW 13.4 11.5 - 14.5 %    PLATELET 855 897 - 509 K/uL    MPV 9.8 8.9 - 12.9 FL    NRBC 0.0 0  WBC    ABSOLUTE NRBC 0.00 0.00 - 0.01 K/uL    NEUTROPHILS 72 32 - 75 %    LYMPHOCYTES 21 12 - 49 %    MONOCYTES 7 5 - 13 %    EOSINOPHILS 0 0 - 7 %    BASOPHILS 0 0 - 1 %    IMMATURE GRANULOCYTES 0 0.0 - 0.5 %    ABS. NEUTROPHILS 4.8 1.8 - 8.0 K/UL    ABS. LYMPHOCYTES 1.4 0.8 - 3.5 K/UL    ABS. MONOCYTES 0.5 0.0 - 1.0 K/UL    ABS. EOSINOPHILS 0.0 0.0 - 0.4 K/UL    ABS. BASOPHILS 0.0 0.0 - 0.1 K/UL    ABS. IMM.  GRANS. 0.0 0.00 - 0.04 K/UL    DF AUTOMATED     TROPONIN I    Collection Time: 09/23/19  6:28 AM   Result Value Ref Range    Troponin-I, Qt. <0.05 <0.05 ng/mL        Assessment:     Assessment:       Principal Problem:    Hypertensive urgency (9/22/2019)    Active Problems:    Sjogren's syndrome ([facility] [address] (3/13/2014)      SS-A antibody positive (3/13/2014)      Cervical pain (neck) (1/27/2015)      Panic disorder with agoraphobia (6/30/2016)      Anxiety disorder (4/25/2018)         Plan:     1. Hypertensive urgency: now better. Most likely due to NSAIDs and steroids. Non specific troponin due to severe HTN. Now -ve. No ischemic EKG changes. No clinical symptoms to suggest IHD. F/u Echo. Add HCTZ. If Echo normal then no further work up. D/w pt.

## 2019-09-23 NOTE — PROGRESS NOTES
Pt has bilateral LE dopplers ordered to rule out DVT. Will await results prior to start of OT services.

## 2019-09-23 NOTE — PROGRESS NOTES
Plan of Care  1) Home with family assistance   2) Pt will need observation letter at time of discharge     Reason for Admission: Hypertensive urgency                    RRAT Score:  5 LOW            Plan for utilizing home health: Per recommendation                     Current Advanced Directive/Advance Care Plan: None on file, pt not interested in arranging any                          Transition of Care Plan:                    Pt is a 48year old,  [place] female, admitted with hypertensive urgency. Pt was alert and oriented when meeting with CM, confirming address, emergency contact and PCP. Pt states she lives with her  in a two level home and is independent with ADLS; 3 steps to enter and 13 to get upstairs. Pt has no DME and drives at baseline. Pt has not had HH or been to rehab in the past. Pt's preferred pharmacy is the [facility], pt states no problems affording or accessing medications. Pt states her  will drive her home at time of discharge and to any follow ups if she cannot drive. CM contacted CM specialist to arrange PCP appt, waiting on response. Anticipate no other CM needs. Care Management Interventions  PCP Verified by CM:  Yes  Mode of Transport at Discharge: Self  Transition of Care Consult (CM Consult): Discharge Planning  MyChart Signup: No  Discharge Durable Medical Equipment: No  Physical Therapy Consult: No  Occupational Therapy Consult: No  Speech Therapy Consult: No  Current Support Network: Lives with Spouse  Confirm Follow Up Transport: Family  Plan discussed with Pt/Family/Caregiver: Yes  Discharge Location  Discharge Placement: Home with family assistance     MARITO Jacobo, [address]    [phone]

## 2019-09-23 NOTE — PROGRESS NOTES
Spoke with nursing and conformed that doppler orders were for prior to admit. There are no active orders for this test during this phase of care. Spoke with pt directly and pt has been up ad barber in room and performing ADLS on her own. She has no concerns in regards to OT and is at her baseline. Screen performed. Will sign off.

## 2019-09-23 NOTE — PROGRESS NOTES
Hospitalist Progress Note    NAME: Tiffanie Ayon   :  1965   MRN:  [id number]       Assessment / Plan:    Hypertensive urgency  · No signs or symptoms of endorgan damage on admission  · Troponin was elevated on admission on the first reading however to be negative twice  · Telemetry monitor  · Resume metoprolol 50 at bedtime dose  · IV hydralazine 20 mg IV as needed every 6 hours for systolic blood pressure greater than 160 mmHg  · Was on steroids and NSAIDs, hold for now  · Cardiology was consulted  · Given her history of panic attacks patient will need evaluation as an outpatient for pheochromocytoma    Anxiety disorder  · History of agoraphobia  · Chronically on Xanax as needed    History of Sjogren's syndrome  · SSA antibodies positive  · Stable at this time        25.0 - 29.9 Overweight / Body mass index is 29.35 kg/m². Code status: Full  Prophylaxis: Hep SQ  Recommended Disposition: Home w/Family     Subjective:     Patient was seen and examined this AM.  No acute events overnight. She denied any chest pain now. Review of Systems:  Symptom Y/N Comments  Symptom Y/N Comments   Fever/Chills n   Chest Pain n    Poor Appetite n   Edema n    Cough n   Abdominal Pain n    Sputum n   Joint Pain n    SOB/MCBRIDE n   Pruritis/Rash n    Nausea/vomit n   Tolerating PT/OT y    Diarrhea n   Tolerating Diet y    Constipation n   Other y Headache       Objective:     VITALS:   Last 24hrs VS reviewed since prior progress note.  Most recent are:  Patient Vitals for the past 24 hrs:   Temp Pulse Resp BP SpO2   19 0754 98.9 °F (37.2 °C) 65 18 161/89 98 %   19 0338 98.2 °F (36.8 °C) 75 18 (!) 148/96 99 %   19 0016 98.8 °F (37.1 °C) 86 18 152/79 100 %   19 98.3 °F (36.8 °C) 77 18 (!) 162/98 95 %   19 -- 66 18 (!) 162/98 --   19 1551 98.1 °F (36.7 °C) 68 18 (!) 161/96 98 %   19 1421 98 °F (36.7 °C) 70 18 164/88 99 %   19 1245 98.7 °F (37.1 °C) 69 18 (!) 170/96 100 %   09/22/19 1132 99.4 °F (37.4 °C) 75 19 (!) 160/91 100 %   09/22/19 1115 -- 67 24 (!) 174/91 98 %   09/22/19 1045 -- 68 18 (!) 163/93 98 %   09/22/19 1015 -- -- -- (!) 169/94 96 %   09/22/19 0957 -- 67 16 156/81 98 %   09/22/19 0902 -- -- -- -- 100 %   09/22/19 0900 -- -- -- 154/89 --   09/22/19 0848 -- 67 16 186/89 100 %       Intake/Output Summary (Last 24 hours) at 9/23/2019 0839  Last data filed at 9/22/2019 1500  Gross per 24 hour   Intake 960 ml   Output 200 ml   Net 760 ml        PHYSICAL EXAM:  General: WD, WN. Alert, cooperative, no acute distress    EENT:  EOMI. Anicteric sclerae. MMM  Resp:  CTA bilaterally, no wheezing or rales. No accessory muscle use  CV:  Regular  rhythm,  No edema  GI:  Soft, Non distended, Non tender.  +Bowel sounds  Neurologic:  Alert and oriented X 3, normal speech,   Psych:   Good insight. Not anxious nor agitated  Skin:  No rashes. No jaundice    Reviewed most current lab test results and cultures  YES  Reviewed most current radiology test results   YES  Review and summation of old records today    NO  Reviewed patient's current orders and MAR    YES  PMH/ reviewed - no change compared to H&P  ________________________________________________________________________  Care Plan discussed with:    Comments   Patient x    Family      RN x    Care Manager x    Consultant                       x Multidiciplinary team rounds were held today with , nursing, pharmacist and clinical coordinator. Patient's plan of care was discussed; medications were reviewed and discharge planning was addressed.      ________________________________________________________________________  Total NON critical care TIME:  35   Minutes    Total CRITICAL CARE TIME Spent:   Minutes non procedure based      Comments   >50% of visit spent in counseling and coordination of care     ________________________________________________________________________  Lex Glaser MD Procedures: see electronic medical records for all procedures/Xrays and details which were not copied into this note but were reviewed prior to creation of Plan. LABS:  I reviewed today's most current labs and imaging studies.   Pertinent labs include:  Recent Labs     09/23/19 0628 09/22/19 0642 09/20/19  1619   WBC 6.7 9.2 4.9   HGB 13.5 14.2 13.9   HCT 41.2 41.6 40.8    301 283     Recent Labs     09/23/19 0628 09/22/19 0642 09/20/19  1619    144 146*   K 3.8 3.7 4.4    111* 106   CO2 25 25 22   * 210* 97   BUN 16 13 14   CREA 0.80 1.07* 0.99   CA 9.2 9.5 10.0   ALB  --  4.6 5.0   TBILI  --  0.3 0.3   SGOT  --  20 23   ALT  --  35 27       Signed: Manuel Roy MD

## 2019-09-23 NOTE — PROGRESS NOTES
Bedside and Verbal shift change report given to [facility] RN (oncoming nurse) by Adelfo Alvares RN (offgoing nurse). Report included the following information SBAR, Kardex, Procedure Summary, Intake/Output, MAR and Recent Results.

## 2019-09-23 NOTE — PROGRESS NOTES
Bedside and Verbal shift change report given to [facility] (oncoming nurse) by Wilman Rosenbaum (offgoing nurse). Report included the following information SBAR, Kardex, Intake/Output, MAR and Recent Results.

## 2019-09-23 NOTE — PROGRESS NOTES
Bedside shift change report given to [place] (oncoming nurse) by Cheryl Russell (offgoing nurse). Report included the following information SBAR, Kardex, Intake/Output, MAR, Accordion, Recent Results and Med Rec Status.

## 2019-09-24 VITALS
WEIGHT: 160 LBS | BODY MASS INDEX: 29.44 KG/M2 | HEIGHT: 62 IN | DIASTOLIC BLOOD PRESSURE: 97 MMHG | TEMPERATURE: 98 F | SYSTOLIC BLOOD PRESSURE: 117 MMHG | OXYGEN SATURATION: 100 % | RESPIRATION RATE: 18 BRPM | HEART RATE: 85 BPM

## 2019-09-24 LAB
ANION GAP SERPL CALC-SCNC: 7 MMOL/L (ref 5–15)
BASOPHILS # BLD: 0 K/UL (ref 0–0.1)
BASOPHILS NFR BLD: 1 % (ref 0–1)
BUN SERPL-MCNC: 15 MG/DL (ref 6–20)
BUN/CREAT SERPL: 18 (ref 12–20)
CALCIUM SERPL-MCNC: 9.2 MG/DL (ref 8.5–10.1)
CHLORIDE SERPL-SCNC: 104 MMOL/L (ref 97–108)
CO2 SERPL-SCNC: 24 MMOL/L (ref 21–32)
CREAT SERPL-MCNC: 0.84 MG/DL (ref 0.55–1.02)
DIFFERENTIAL METHOD BLD: NORMAL
EOSINOPHIL # BLD: 0.1 K/UL (ref 0–0.4)
EOSINOPHIL NFR BLD: 1 % (ref 0–7)
ERYTHROCYTE [DISTWIDTH] IN BLOOD BY AUTOMATED COUNT: 12.9 % (ref 11.5–14.5)
GLUCOSE SERPL-MCNC: 109 MG/DL (ref 65–100)
HCT VFR BLD AUTO: 40.9 % (ref 35–47)
HGB BLD-MCNC: 14.2 G/DL (ref 11.5–16)
IMM GRANULOCYTES # BLD AUTO: 0 K/UL (ref 0–0.04)
IMM GRANULOCYTES NFR BLD AUTO: 0 % (ref 0–0.5)
LYMPHOCYTES # BLD: 2.2 K/UL (ref 0.8–3.5)
LYMPHOCYTES NFR BLD: 35 % (ref 12–49)
MCH RBC QN AUTO: 30.1 PG (ref 26–34)
MCHC RBC AUTO-ENTMCNC: 34.7 G/DL (ref 30–36.5)
MCV RBC AUTO: 86.7 FL (ref 80–99)
MONOCYTES # BLD: 0.6 K/UL (ref 0–1)
MONOCYTES NFR BLD: 10 % (ref 5–13)
NEUTS SEG # BLD: 3.2 K/UL (ref 1.8–8)
NEUTS SEG NFR BLD: 53 % (ref 32–75)
NRBC # BLD: 0 K/UL (ref 0–0.01)
NRBC BLD-RTO: 0 PER 100 WBC
PLATELET # BLD AUTO: 237 K/UL (ref 150–400)
PMV BLD AUTO: 9.6 FL (ref 8.9–12.9)
POTASSIUM SERPL-SCNC: 3.2 MMOL/L (ref 3.5–5.1)
RBC # BLD AUTO: 4.72 M/UL (ref 3.8–5.2)
SODIUM SERPL-SCNC: 135 MMOL/L (ref 136–145)
WBC # BLD AUTO: 6.1 K/UL (ref 3.6–11)

## 2019-09-24 PROCEDURE — 74011250637 HC RX REV CODE- 250/637: Performed by: INTERNAL MEDICINE

## 2019-09-24 PROCEDURE — 85025 COMPLETE CBC W/AUTO DIFF WBC: CPT

## 2019-09-24 PROCEDURE — 80048 BASIC METABOLIC PNL TOTAL CA: CPT

## 2019-09-24 PROCEDURE — 96376 TX/PRO/DX INJ SAME DRUG ADON: CPT

## 2019-09-24 PROCEDURE — 94760 N-INVAS EAR/PLS OXIMETRY 1: CPT

## 2019-09-24 PROCEDURE — 74011250636 HC RX REV CODE- 250/636: Performed by: INTERNAL MEDICINE

## 2019-09-24 PROCEDURE — 36415 COLL VENOUS BLD VENIPUNCTURE: CPT

## 2019-09-24 PROCEDURE — 99218 HC RM OBSERVATION: CPT

## 2019-09-24 RX ORDER — BUTALBITAL, ACETAMINOPHEN AND CAFFEINE 50; 325; 40 MG/1; MG/1; MG/1
1 TABLET ORAL
Qty: 20 TAB | Refills: 0 | Status: SHIPPED | OUTPATIENT
Start: 2019-09-24 | End: 2022-07-29 | Stop reason: ALTCHOICE

## 2019-09-24 RX ORDER — BUTALBITAL, ACETAMINOPHEN AND CAFFEINE 50; 325; 40 MG/1; MG/1; MG/1
1 TABLET ORAL
Status: DISCONTINUED | OUTPATIENT
Start: 2019-09-24 | End: 2019-09-24 | Stop reason: HOSPADM

## 2019-09-24 RX ORDER — POTASSIUM CHLORIDE 750 MG/1
10 TABLET, FILM COATED, EXTENDED RELEASE ORAL DAILY
Status: DISCONTINUED | OUTPATIENT
Start: 2019-09-24 | End: 2019-09-24 | Stop reason: HOSPADM

## 2019-09-24 RX ORDER — AMLODIPINE BESYLATE 5 MG/1
5 TABLET ORAL DAILY
Status: DISCONTINUED | OUTPATIENT
Start: 2019-09-24 | End: 2019-09-24 | Stop reason: HOSPADM

## 2019-09-24 RX ORDER — POTASSIUM CHLORIDE 20 MEQ/1
40 TABLET, EXTENDED RELEASE ORAL
Status: COMPLETED | OUTPATIENT
Start: 2019-09-24 | End: 2019-09-24

## 2019-09-24 RX ORDER — AMLODIPINE BESYLATE 5 MG/1
5 TABLET ORAL DAILY
Qty: 30 TAB | Refills: 0 | Status: SHIPPED | OUTPATIENT
Start: 2019-09-24 | End: 2019-10-08

## 2019-09-24 RX ADMIN — POTASSIUM CHLORIDE 40 MEQ: 1500 TABLET, FILM COATED, EXTENDED RELEASE ORAL at 12:01

## 2019-09-24 RX ADMIN — POTASSIUM CHLORIDE 10 MEQ: 750 TABLET, EXTENDED RELEASE ORAL at 12:02

## 2019-09-24 RX ADMIN — HYDROCHLOROTHIAZIDE 12.5 MG: 25 TABLET ORAL at 09:35

## 2019-09-24 RX ADMIN — HYDRALAZINE HYDROCHLORIDE 10 MG: 20 INJECTION INTRAMUSCULAR; INTRAVENOUS at 09:35

## 2019-09-24 RX ADMIN — ACETAMINOPHEN 500 MG: 500 TABLET ORAL at 09:34

## 2019-09-24 RX ADMIN — HEPARIN SODIUM 5000 UNITS: 5000 INJECTION INTRAVENOUS; SUBCUTANEOUS at 05:21

## 2019-09-24 RX ADMIN — AMLODIPINE BESYLATE 5 MG: 5 TABLET ORAL at 12:01

## 2019-09-24 RX ADMIN — HYDROCODONE BITARTRATE AND ACETAMINOPHEN 1 TABLET: 5; 325 TABLET ORAL at 05:22

## 2019-09-24 NOTE — PROGRESS NOTES
Plan of Care  1) Home with family   2) Home with f.u appts     12:09 PM- Discharge order noted; CM on hold with pt PCP office to make follow up appt. CM will also make Cardiology appt, RN aware. 12:12 PM- CM unable to get in contact with pt PCP office. CM informed pt to make follow up appt. CM made pt a new pt appt at [address] for October 8th at 10:15. AVS updated. Pt  transporting at discharge, no further CM needs identified. Pt is cleared to discharge from CM perspective. Care Management Interventions  PCP Verified by CM:  Yes  Mode of Transport at Discharge: Self  Transition of Care Consult (CM Consult): Discharge Planning  MyChart Signup: No  Discharge Durable Medical Equipment: No  Physical Therapy Consult: No  Occupational Therapy Consult: No  Speech Therapy Consult: No  Current Support Network: Lives with Spouse  Confirm Follow Up Transport: Family  Plan discussed with Pt/Family/Caregiver: Yes  Discharge Location  Discharge Placement: Home with family assistance     MARITO Jacobo, [address]    [phone]

## 2019-09-24 NOTE — DISCHARGE INSTRUCTIONS
HOSPITALIST DISCHARGE INSTRUCTIONS    NAME: Leonel Stafford   :  1965   MRN:  [id number]     Date/Time:  2019 11:13 AM    ADMIT DATE: 2019     DISCHARGE DATE: 2019     DISCHARGE DIAGNOSIS:  Hypertensive urgency  Anxiety disorder  History of Sjogren's syndrome    MEDICATIONS:  · It is important that you take the medication exactly as they are prescribed. · Keep your medication in the bottles provided by the pharmacist and keep a list of the medication names, dosages, and times to be taken in your wallet. · Do not take other medications without consulting your doctor. Pain Management: per above medications    What to do at Home    Recommended diet:  Resume previous diet    Recommended activity: Activity as tolerated    If you have questions regarding the hospital related prescriptions or hospital related issues [facility] [phone]. If you experience any of the following symptoms then please call your primary care physician or return to the emergency room if you cannot get hold of your doctor:  Fever, chills, nausea, vomiting, diarrhea, change in mentation, falling, bleeding, shortness of breath        Information obtained by :  I understand that if any problems occur once I am at home I am to contact my physician. I understand and acknowledge receipt of the instructions indicated above.                                                                                                                                            Physician's or R.N.'s Signature                                                                  Date/Time                                                                                                                                              Patient or Representative Signature                                                          Date/Time

## 2019-09-24 NOTE — PROGRESS NOTES
Reviewed discharge instructions, prescriptions, and side effects with patient. Reviewed follow-up instructions, and medication instructions. Answered all questions and provided contact information for future questions. Took IV out per policy, Catheter tip intact. Provided Post-op Hygiene kit. Going home in a car with home health.

## 2019-09-24 NOTE — ROUTINE PROCESS
[address] [facility] [facility] [place][note 2]  [phone]  Fax: [phone]        To Whom may it concern      This letter is to certify that  Javier Perez   was admitted under our care on 9/22/2019 and discharged on 9/24/2019                  Javier Perez  may return to work/school on Monday 9/30/2019    If you have any questions, please do not hesitate to contact me.       Dr. Nikia Ventura  [phone]

## 2019-09-24 NOTE — PROGRESS NOTES
9/24/2019 9:30 AM    Admit Date: 9/22/2019    Admit Diagnosis: Hypertensive urgency [I16.0]    Subjective:     Pat Baires   denies chest pain, chest pressure/discomfort, dyspnea, palpitations, irregular heart beats, near-syncope, syncope, fatigue, orthopnea, paroxysmal nocturnal dyspnea, exertional chest pressure/discomfort, claudication, lower extremity edema, tachypnea. Visit Vitals  BP (!) 171/94 (BP 1 Location: Right arm, BP Patient Position: At rest)   Pulse 68   Temp 98.4 °F (36.9 °C)   Resp 18   Ht 5' 2\" (1.575 m)   Wt 160 lb (72.6 kg)   SpO2 100%   Breastfeeding? No   BMI 29.26 kg/m²     Current Facility-Administered Medications   Medication Dose Route Frequency    potassium chloride SR (KLOR-CON 10) tablet 10 mEq  10 mEq Oral DAILY    hydroCHLOROthiazide (HYDRODIURIL) tablet 12.5 mg  12.5 mg Oral DAILY    ALPRAZolam (XANAX) tablet 0.5 mg  0.5 mg Oral QHS PRN    metoprolol succinate (TOPROL-XL) XL tablet 50 mg  50 mg Oral QHS    sodium chloride (NS) flush 5-40 mL  5-40 mL IntraVENous Q8H    sodium chloride (NS) flush 5-40 mL  5-40 mL IntraVENous PRN    acetaminophen (TYLENOL) tablet 500 mg  500 mg Oral Q4H PRN    HYDROcodone-acetaminophen (NORCO) 5-325 mg per tablet 1 Tab  1 Tab Oral Q6H PRN    naloxone (NARCAN) injection 0.4 mg  0.4 mg IntraVENous PRN    ondansetron (ZOFRAN) injection 2 mg  2 mg IntraVENous Q6H PRN    bisacodyl (DULCOLAX) tablet 5 mg  5 mg Oral DAILY PRN    heparin (porcine) injection 5,000 Units  5,000 Units SubCUTAneous Q8H    hydrALAZINE (APRESOLINE) 20 mg/mL injection 10 mg  10 mg IntraVENous Q6H PRN         Objective:      Visit Vitals  BP (!) 171/94 (BP 1 Location: Right arm, BP Patient Position: At rest)   Pulse 68   Temp 98.4 °F (36.9 °C)   Resp 18   Ht 5' 2\" (1.575 m)   Wt 160 lb (72.6 kg)   SpO2 100%   Breastfeeding? No   BMI 29.26 kg/m²       Physical Exam:  Abdomen: soft, non-tender.  Bowel sounds normal.   Extremities: no cyanosis or edema  Heart: regular rate and rhythm, S1, S2 normal, no murmur, click, rub or gallop  Lungs: clear to auscultation bilaterally  Neurologic: Grossly normal    Data Review:   Labs:    Recent Results (from the past 24 hour(s))   ECHO ADULT COMPLETE    Collection Time: 09/23/19  5:24 PM   Result Value Ref Range    LV E' Lateral Velocity 8.63 cm/s    LV E' Septal Velocity 5.79 cm/s    Aortic Valve Systolic Peak Velocity 068.97 cm/s    Aortic Valve Area by Continuity of Peak Velocity 2.6 cm2    AoV PG 12.6 mmHg    LVIDd 4.00 3.9 - 5.3 cm    LVPWd 1.49 (A) 0.6 - 0.9 cm    LVIDs 2.02 cm    IVSd 1.48 (A) 0.6 - 0.9 cm    LVOT d 1.95 cm    LVOT Peak Velocity 153.13 cm/s    LVOT Peak Gradient 9.4 mmHg    MV A Nnamdi 100.23 cm/s    MV E Nnamdi 71.30 cm/s    MV E/A 0.71     LA Vol 4C 32.58 22 - 52 mL    LA Area 4C 14.2 cm2    LV Mass .5 (A) 67 - 162 g    LV Mass AL Index 156.7 (A) 43 - 95 g/m2    E/E' lateral 8.26     E/E' septal 12.31     RVSP 16.9 mmHg    E/E' ratio (averaged) 10.29     Est. RA Pressure 10.0 mmHg    Mitral Regurgitant Peak Velocity 161.05 cm/s    Mitral Valve E Wave Deceleration Time 320.9 ms    Left Atrium Major Axis 2.70 cm    Triscuspid Valve Regurgitation Peak Gradient 6.9 mmHg    TR Max Velocity 130.95 cm/s    PASP 16.9 mmHg    LA Vol Index 18.74 16 - 28 ml/m2    MR Peak Gradient 10.4 mmHg    DEEP/BSA Pk Nnamdi 1.4 CC5/L5   METABOLIC PANEL, BASIC    Collection Time: 09/24/19  4:40 AM   Result Value Ref Range    Sodium 135 (L) 136 - 145 mmol/L    Potassium 3.2 (L) 3.5 - 5.1 mmol/L    Chloride 104 97 - 108 mmol/L    CO2 24 21 - 32 mmol/L    Anion gap 7 5 - 15 mmol/L    Glucose 109 (H) 65 - 100 mg/dL    BUN 15 6 - 20 MG/DL    Creatinine 0.84 0.55 - 1.02 MG/DL    BUN/Creatinine ratio 18 12 - 20      GFR est AA >60 >60 ml/min/1.73m2    GFR est non-AA >60 >60 ml/min/1.73m2    Calcium 9.2 8.5 - 10.1 MG/DL   CBC WITH AUTOMATED DIFF    Collection Time: 09/24/19  4:40 AM   Result Value Ref Range    WBC 6.1 3.6 - 11.0 K/uL    RBC 4.72 3.80 - 5.20 M/uL    HGB 14.2 11.5 - 16.0 g/dL    HCT 40.9 35.0 - 47.0 %    MCV 86.7 80.0 - 99.0 FL    MCH 30.1 26.0 - 34.0 PG    MCHC 34.7 30.0 - 36.5 g/dL    RDW 12.9 11.5 - 14.5 %    PLATELET 073 694 - 672 K/uL    MPV 9.6 8.9 - 12.9 FL    NRBC 0.0 0  WBC    ABSOLUTE NRBC 0.00 0.00 - 0.01 K/uL    NEUTROPHILS 53 32 - 75 %    LYMPHOCYTES 35 12 - 49 %    MONOCYTES 10 5 - 13 %    EOSINOPHILS 1 0 - 7 %    BASOPHILS 1 0 - 1 %    IMMATURE GRANULOCYTES 0 0.0 - 0.5 %    ABS. NEUTROPHILS 3.2 1.8 - 8.0 K/UL    ABS. LYMPHOCYTES 2.2 0.8 - 3.5 K/UL    ABS. MONOCYTES 0.6 0.0 - 1.0 K/UL    ABS. EOSINOPHILS 0.1 0.0 - 0.4 K/UL    ABS. BASOPHILS 0.0 0.0 - 0.1 K/UL    ABS. IMM. GRANS. 0.0 0.00 - 0.04 K/UL    DF AUTOMATED         Telemetry: normal sinus rhythm      Assessment:     Principal Problem:    Hypertensive urgency (9/22/2019)    Active Problems:    Sjogren's syndrome (HCC) (3/13/2014)      SS-A antibody positive (3/13/2014)      Cervical pain (neck) (1/27/2015)      Panic disorder with agoraphobia (6/30/2016)      Anxiety disorder (4/25/2018)        Plan:     BP better. Echo noted. Continue current meds. Add K. Ok to dc from cardiac standpoint. Will f/u as out pt.

## 2019-09-24 NOTE — DISCHARGE SUMMARY
Hospitalist Discharge Summary     Patient ID:  Iva Sotelo  [id number]  89 y.o.  1965 9/22/2019    PCP on record: Joy Hwang MD    Admit date: 9/22/2019  Discharge date and time: 9/24/2019    DISCHARGE DIAGNOSIS:  Hypertensive urgency  Anxiety disorder  History of Sjogren's syndrome    CONSULTATIONS:  IP CONSULT TO CARDIOLOGY    Excerpted HPI from H&P of Cortez Simon MD:  The pt went to her PCP two days ago for persistent RIGHT hip area pain. Xrays were done and pt received Rx for Indomethacin + medrol dose pack. She started the steroid that same date and started the NSAID yesterday. By the evening yesterday, she developed persistent generalized headache that didn't relent. Then she developed nausea and vomiting. This was around 9pm when she vomited first time with near resolution of her headache. At that time she did feel some precordial pain and tightness. She has similar at least three cycles of above issues that didn't get better through the night. She came to ER early this morning.    ______________________________________________________________________  DISCHARGE SUMMARY/HOSPITAL COURSE:  for full details see H&P, daily progress notes, labs, consult notes. Hypertensive urgency POA  Seems due to steroids, NSAIDs and not been taking her BP meds  BP more stable with resumption of metoprolol but still elevated.  Pt reported, she always runs her SBP > 150 despite on metoprolol  Will add Norvasc 5mg PO daily   Recommend close followup with PCP for BP management    Hypokalemia  Repleted before discharge  Due to HCTZ given in hospital but she will be discharge on norvasc instead    Headaches  Due to BP elevated  BP Management as above  CT head negative  PRN Fioricet    Right hip pain (recently) - resolved  Stopped steroids and NSAIDs as no clear indication  Recommended if happens again then consider followup with orthopedic surgery    Elevated troponin  Due to accelerated HTN as above  Normalized with improvement in BP  Seen by cardiology, who will continue to follow as an OP    Anxiety disorder  Continue Anxiolytics     History of Sjogren's syndrome  SSA antibodies positive  OP followup with her physicians      _______________________________________________________________________  Patient seen and examined by me on discharge day. Pertinent Findings:  Gen:    Not in distress  Chest: Clear lungs  CVS:   Regular rhythm. No edema  Abd:  Soft, not distended, not tender  Neuro:  Alert, non focal  _______________________________________________________________________  DISCHARGE MEDICATIONS:   Current Discharge Medication List      START taking these medications    Details   amLODIPine (NORVASC) 5 mg tablet Take 1 Tab by mouth daily. Qty: 30 Tab, Refills: 0      butalbital-acetaminophen-caffeine (FIORICET, ESGIC) -40 mg per tablet Take 1 Tab by mouth every six (6) hours as needed for Headache. Qty: 20 Tab, Refills: 0         CONTINUE these medications which have NOT CHANGED    Details   ALPRAZolam (XANAX) 0.5 mg tablet Take 1 Tab by mouth nightly as needed for Anxiety or Sleep. Max Daily Amount: 0.5 mg.  Qty: 30 Tab, Refills: 2    Associated Diagnoses: Panic attack as reaction to stress; Panic disorder with agoraphobia; Anxiety disorder due to known physiological condition; HTN, goal below 140/90      metoprolol succinate (TOPROL-XL) 50 mg XL tablet Take 1 Tab by mouth nightly. Qty: 90 Tab, Refills: 4    Associated Diagnoses: Panic attack as reaction to stress; Panic disorder with agoraphobia; Anxiety disorder due to known physiological condition; HTN, goal below 140/90; Diarrhea due to malabsorption         STOP taking these medications       indomethacin (INDOCIN) 50 mg capsule Comments:   Reason for Stopping:         methylPREDNISolone (MEDROL DOSEPACK) 4 mg tablet Comments:   Reason for Stopping:                  Follow-up Information     Follow up With Specialties Details Why Contact Info    Deshawn House MD Family Practice In 3 days  [address]  [phone]      Blood Pressure check   with primary care physician in 3-4 days     Beatriz Sal MD Cardiology Schedule an appointment as soon as possible for a visit in 3 weeks  [address] [facility]  [phone]        ______________________________    Risk of deterioration: Low    Condition at Discharge:  Stable  __________________________________________________________________    Disposition  Home with family, no needs    ____________________________________________________________________    Code Status: Full Code  ___________________________________________________________________      Total time in minutes spent coordinating this discharge (includes going over instructions, follow-up, prescriptions, and preparing report for sign off to her PCP) :  35 minutes    Signed:  Susan Moscoso MD

## 2019-10-08 ENCOUNTER — OFFICE VISIT (OUTPATIENT)
Dept: CARDIOLOGY CLINIC | Age: 54
End: 2019-10-08

## 2019-10-08 VITALS
WEIGHT: 164.8 LBS | BODY MASS INDEX: 30.33 KG/M2 | SYSTOLIC BLOOD PRESSURE: 150 MMHG | HEART RATE: 74 BPM | OXYGEN SATURATION: 99 % | DIASTOLIC BLOOD PRESSURE: 90 MMHG | HEIGHT: 62 IN | RESPIRATION RATE: 16 BRPM

## 2019-10-08 DIAGNOSIS — I10 HTN, GOAL BELOW 140/90: Primary | ICD-10-CM

## 2019-10-08 RX ORDER — AMLODIPINE BESYLATE 10 MG/1
10 TABLET ORAL DAILY
Qty: 90 TAB | Refills: 1 | Status: SHIPPED | OUTPATIENT
Start: 2019-10-08 | End: 2020-04-12

## 2019-10-08 NOTE — PROGRESS NOTES
Chief Complaint   Patient presents with   [facility] Follow Up    Hypertension     Patient home wrist monitor was 139/97 10/5/19 she C/O headaches.

## 2019-10-08 NOTE — PROGRESS NOTES
Lovely Stiles DNP, ANP-BC  Subjective/HPI:     Yang Burns is a 48 y.o. female is here for transitional care hospital follow-up patient admitted for hypertensive crisis. Was discharged with amlodipine 5 mg, continued on beta-blocker. She reports home blood pressure monitoring has improved however has intermittent headaches. She is not using NSAIDs or steroids for pain. Inpatient echocardiogram shows structurally normal heart, mildly elevated troponin trended down during admission; felt secondary to hypertensive crisis and not ischemia. She denies dyspnea on exertion or chest pain. 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    Elevated BP 6/6/2016    Elevated heart rate and blood pressure 6/30/2016    HTN, goal below 140/90 9/21/2017    Panic attack as reaction to stress 6/30/2016    Panic disorder with agoraphobia 6/30/2016    Right shoulder pain 1/27/2015    Thyroid nodule       Past Surgical History:   Procedure Laterality Date    HX GYN      HX HYSTERECTOMY  2012    THYROIDECTOMY  2002     No Known Allergies   Family History   Problem Relation Age of Onset    Hypertension Mother     Hypertension Sister       Current Outpatient Medications   Medication Sig    amLODIPine (NORVASC) 5 mg tablet Take 1 Tab by mouth daily.  butalbital-acetaminophen-caffeine (FIORICET, ESGIC) -40 mg per tablet Take 1 Tab by mouth every six (6) hours as needed for Headache.  ALPRAZolam (XANAX) 0.5 mg tablet Take 1 Tab by mouth nightly as needed for Anxiety or Sleep. Max Daily Amount: 0.5 mg.    metoprolol succinate (TOPROL-XL) 50 mg XL tablet Take 1 Tab by mouth nightly. No current facility-administered medications for this visit.        Vitals:    10/08/19 1034   BP: 150/90   Pulse: 74   Resp: 16   SpO2: 99%   Weight: 164 lb 12.8 oz (74.8 kg) Height: 5' 2\" (1.575 m)     Social History     Socioeconomic History    Marital status:      Spouse name: Not on file    Number of children: Not on file    Years of education: Not on file    Highest education level: Not on file   Occupational History    Not on file   Social Needs    Financial resource strain: Not on file    Food insecurity:     Worry: Not on file     Inability: Not on file    Transportation needs:     Medical: Not on file     Non-medical: Not on file   Tobacco Use    Smoking status: Never Smoker    Smokeless tobacco: Never Used   Substance and Sexual Activity    Alcohol use: No    Drug use: No    Sexual activity: Yes     Partners: Male     Birth control/protection: Surgical   Lifestyle    Physical activity:     Days per week: Not on file     Minutes per session: Not on file    Stress: Not on file   Relationships    Social connections:     Talks on phone: Not on file     Gets together: Not on file     Attends Rastafarian service: Not on file     Active member of club or organization: Not on file     Attends meetings of clubs or organizations: Not on file     Relationship status: Not on file    Intimate partner violence:     Fear of current or ex partner: Not on file     Emotionally abused: Not on file     Physically abused: Not on file     Forced sexual activity: Not on file   Other Topics Concern    Not on file   Social History Narrative    Not on file       I have reviewed the nurses notes, vitals, problem list, allergy list, medical history, family, social history and medications. Review of Symptoms:    General: Pt denies excessive weight gain or loss. Pt is able to conduct ADL's  HEENT: Denies blurred vision, headaches, epistaxis and difficulty swallowing. Respiratory: Denies shortness of breath, MCBRIDE, wheezing or stridor.   Cardiovascular: Denies precordial pain, palpitations, edema or PND  Gastrointestinal: Denies poor appetite, indigestion, abdominal pain or blood in stool  Musculoskeletal: Denies pain or swelling from muscles or joints  Neurologic: Denies tremor, paresthesias, or sensory motor disturbance  Skin: Denies rash, itching or texture change. Physical Exam:      General: Well developed, in no acute distress, cooperative and alert  HEENT: No carotid bruits, no JVD, trach is midline. Neck Supple, PERRL, EOM intact. Heart:  Normal S1/S2 negative S3 or S4. Regular, no murmur, gallop or rub. Respiratory: Clear bilaterally x 4, no wheezing or rales  Abdomen:   Soft, non-tender, no masses, bowel sounds are active. Extremities:  No edema, normal cap refill, no cyanosis, atraumatic. Neuro: A&Ox3, speech clear, gait stable. Skin: Skin color is normal. No rashes or lesions.  Non diaphoretic  Vascular: 2+ pulses symmetric in all extremities    Cardiographics    ECG: Sinus  Results for orders placed or performed during the hospital encounter of 09/22/19   EKG, 12 LEAD, INITIAL   Result Value Ref Range    Ventricular Rate 79 BPM    Atrial Rate 79 BPM    P-R Interval 136 ms    QRS Duration 74 ms    Q-T Interval 374 ms    QTC Calculation (Bezet) 428 ms    Calculated R Axis 25 degrees    Calculated T Axis 16 degrees    Diagnosis       Normal sinus rhythm with sinus arrhythmia  Nonspecific ST and T wave abnormality  When compared with ECG of 02-JUL-2016 10:35,  ST now depressed in Anterior leads  Confirmed by Lisa Boston ([id number]) on 9/23/2019 1:42:49 PM           Cardiology Labs:  Lab Results   Component Value Date/Time    Cholesterol, total 206 (H) 09/20/2019 04:19 PM    HDL Cholesterol 68 09/20/2019 04:19 PM    LDL, calculated 116 (H) 03/30/2011 04:19 PM    Triglyceride 46 03/30/2011 04:19 PM       Lab Results   Component Value Date/Time    Sodium 135 (L) 09/24/2019 04:40 AM    Potassium 3.2 (L) 09/24/2019 04:40 AM    Chloride 104 09/24/2019 04:40 AM    CO2 24 09/24/2019 04:40 AM    Anion gap 7 09/24/2019 04:40 AM    Glucose 109 (H) 09/24/2019 04:40 AM    BUN 15 09/24/2019 04:40 AM    Creatinine 0.84 09/24/2019 04:40 AM    BUN/Creatinine ratio 18 09/24/2019 04:40 AM    GFR est AA >60 09/24/2019 04:40 AM    GFR est non-AA >60 09/24/2019 04:40 AM    Calcium 9.2 09/24/2019 04:40 AM    Bilirubin, total 0.3 09/22/2019 06:42 AM    AST (SGOT) 20 09/22/2019 06:42 AM    Alk. phosphatase 99 09/22/2019 06:42 AM    Protein, total 8.6 (H) 09/22/2019 06:42 AM    Albumin 4.6 09/22/2019 06:42 AM    Globulin 4.0 09/22/2019 06:42 AM    A-G Ratio 1.2 09/22/2019 06:42 AM    ALT (SGPT) 35 09/22/2019 06:42 AM           Assessment:     Assessment:     Diagnoses and all orders for this visit:    1. HTN, goal below 140/90  -     AMB POC EKG ROUTINE W/ 12 LEADS, INTER & REP        ICD-10-CM ICD-9-CM    1. HTN, goal below 140/90 I10 401.9 AMB POC EKG ROUTINE W/ 12 LEADS, INTER & REP     Orders Placed This Encounter    AMB POC EKG ROUTINE W/ 12 LEADS, INTER & REP     Order Specific Question:   Reason for Exam:     Answer:   routine        Plan:     Patient presents with improvement in blood pressure however remains elevated. Will uptitrate amlodipine 10 mg daily. Follow-up in 1 month. Js Brewer NP      Please note that this dictation was completed with [employer], the computer voice recognition software. Quite often unanticipated grammatical, syntax, homophones, and other interpretive errors are inadvertently transcribed by the computer software. Please disregard these errors. Please excuse any errors that have escaped final proofreading. Thank you. Patient seen and examined by me with nurse practitioner. I personally performed all components of the history, physical, and medical decision making and agree with the assessment and plan as noted.     Lindy Celaya MD

## 2020-04-12 RX ORDER — AMLODIPINE BESYLATE 10 MG/1
TABLET ORAL
Qty: 90 TAB | Refills: 1 | Status: SHIPPED | OUTPATIENT
Start: 2020-04-12 | End: 2020-10-16

## 2020-06-08 ENCOUNTER — VIRTUAL VISIT (OUTPATIENT)
Dept: FAMILY MEDICINE CLINIC | Age: 55
End: 2020-06-08

## 2020-06-08 DIAGNOSIS — F40.01 PANIC DISORDER WITH AGORAPHOBIA: ICD-10-CM

## 2020-06-08 DIAGNOSIS — F06.4 ANXIETY DISORDER DUE TO KNOWN PHYSIOLOGICAL CONDITION: ICD-10-CM

## 2020-06-08 DIAGNOSIS — I10 HTN, GOAL BELOW 140/90: ICD-10-CM

## 2020-06-08 DIAGNOSIS — F43.0 PANIC ATTACK AS REACTION TO STRESS: Primary | ICD-10-CM

## 2020-06-08 DIAGNOSIS — F41.0 PANIC ATTACK AS REACTION TO STRESS: Primary | ICD-10-CM

## 2020-06-08 RX ORDER — ALPRAZOLAM 0.5 MG/1
0.5 TABLET ORAL
Qty: 30 TAB | Refills: 4 | Status: SHIPPED | OUTPATIENT
Start: 2020-06-08 | End: 2022-07-29 | Stop reason: ALTCHOICE

## 2020-06-08 NOTE — PROGRESS NOTES
HISTORY OF PRESENT ILLNESS  Caridad Morel is a 48 y.o. female. HPI    Today's Pt main concerns were provided on virtual visit and Video telemed format,  Present on VV for the concern of the current medical conditions,  pt is w/ comorbid history and unaware if the pt has been exposed to covid-19 individual,   Pt Have been staying at home for couple of wks,  pt has no fever no cough no dyspnea, no ha, not dizzy, nl smell nl taste, no N/V/D, no body ache. Patient present with depression, the anxiety, tired lack of sleep and panicky states of mind, stating that Xanax has helped the current serious medical condition and the tremor, she needs refill, tried to come off but became unstable take it as needed, has only 2 tabs left at this time, aware of its side effects and dependency,      Patient's current medical condition is not controlled without medications, not able to tolerate any SSRI or other recommended antidepressive or antianxiety meds except for the current SHEA enhancers, recently given Lorazepam b/c of recall not helping and unable to sleep,  Stating that bp elevates and she has had hx of hospital ER visits often for her serious medical condition flare ups,     Patient state that it isnot  getting better: pt states and reports of feeling less anxius, less guilty feeling,  less Hoplessness, ns/nh,ni,nh, less trouble with weight gain or loss, no tendency of etoh or illicit drug use, more ability of sleep, more ablitiy  to concentrate at home with the current medications,and all together a safe feeling at home   Stating that if she does not take her med for her serious medical condition her bp elevates and current bp meds not able to control it,    Current Outpatient Medications   Medication Sig Dispense Refill    amLODIPine (NORVASC) 10 mg tablet TAKE 1 TABLET BY MOUTH EVERY DAY 90 Tab 1    ALPRAZolam (XANAX) 0.5 mg tablet Take 1 Tab by mouth nightly as needed for Anxiety or Sleep.  Max Daily Amount: 0.5 mg. 30 Tab 2    metoprolol succinate (TOPROL-XL) 50 mg XL tablet Take 1 Tab by mouth nightly. 90 Tab 4    butalbital-acetaminophen-caffeine (FIORICET, ESGIC) -40 mg per tablet Take 1 Tab by mouth every six (6) hours as needed for Headache. 20 Tab 0     No Known Allergies  Past Medical History:   Diagnosis Date    Anxiety disorder 4/25/2018    Cervical pain (neck) 1/27/2015    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    Elevated BP 6/6/2016    Elevated heart rate and blood pressure 6/30/2016    HTN, goal below 140/90 9/21/2017    Panic attack as reaction to stress 6/30/2016    Panic disorder with agoraphobia 6/30/2016    Right shoulder pain 1/27/2015    Thyroid nodule      Past Surgical History:   Procedure Laterality Date    HX GYN      HX HYSTERECTOMY  2012    THYROIDECTOMY  2002     Family History   Problem Relation Age of Onset    Hypertension Mother     Hypertension Sister      Social History     Tobacco Use    Smoking status: Never Smoker    Smokeless tobacco: Never Used   Substance Use Topics    Alcohol use: No      Lab Results   Component Value Date/Time    WBC 6.1 09/24/2019 04:40 AM    HGB 14.2 09/24/2019 04:40 AM    HCT 40.9 09/24/2019 04:40 AM    PLATELET 509 68/58/3275 04:40 AM    MCV 86.7 09/24/2019 04:40 AM     Lab Results   Component Value Date/Time    TSH 0.72 09/22/2019 06:42 AM         Review of Systems   Constitutional: Negative for chills, fever and malaise/fatigue. HENT: Negative for ear pain and nosebleeds. Eyes: Negative for blurred vision, pain and discharge. Respiratory: Negative for shortness of breath. Cardiovascular: Negative for chest pain and leg swelling. Gastrointestinal: Negative for constipation, diarrhea, nausea and vomiting. Genitourinary: Negative for frequency. Musculoskeletal: Negative for joint pain. Skin: Negative for itching and rash. Neurological: Negative for headaches. Psychiatric/Behavioral: Positive for depression. Negative for hallucinations, substance abuse and suicidal ideas. The patient is nervous/anxious and has insomnia. Physical Exam  Constitutional:       Appearance: She is obese. She is not ill-appearing or toxic-appearing. HENT:      Head: Normocephalic and atraumatic. Mouth/Throat:      Mouth: Mucous membranes are moist.   Neurological:      Mental Status: She is alert and oriented to person, place, and time. Psychiatric:         Attention and Perception: Perception normal. She is inattentive. She does not perceive auditory or visual hallucinations. Mood and Affect: Mood is anxious. Mood is not depressed or elated. Affect is flat. Affect is not labile, blunt, angry, tearful or inappropriate. Speech: She is communicative. Speech is rapid and pressured. Speech is not delayed, slurred or tangential.         Behavior: Behavior is agitated. Behavior is not slowed, aggressive, withdrawn, hyperactive or combative. Behavior is cooperative. Thought Content: Thought content normal. Thought content is not paranoid or delusional. Thought content does not include homicidal or suicidal ideation. Cognition and Memory: Memory is not impaired. She does not exhibit impaired recent memory or impaired remote memory. Judgment: Judgment normal. Judgment is not inappropriate. ASSESSMENT and PLAN  Diagnoses and all orders for this visit:    1. Panic attack as reaction to stress  -     ALPRAZolam (XANAX) 0.5 mg tablet; Take 1 Tab by mouth nightly as needed for Anxiety or Sleep. Max Daily Amount: 0.5 mg.    2. Panic disorder with agoraphobia  -     ALPRAZolam (XANAX) 0.5 mg tablet; Take 1 Tab by mouth nightly as needed for Anxiety or Sleep. Max Daily Amount: 0.5 mg.    3. Anxiety disorder due to known physiological condition  -     ALPRAZolam (XANAX) 0.5 mg tablet; Take 1 Tab by mouth nightly as needed for Anxiety or Sleep.  Max Daily Amount: 0.5 mg.    4. HTN, goal below 140/90  -     ALPRAZolam (XANAX) 0.5 mg tablet; Take 1 Tab by mouth nightly as needed for Anxiety or Sleep. Max Daily Amount: 0.5 mg. HTN controlled, no change of bp meds at this time,  Discussed sodiumrestriction,      Patient was told that the medication is not safe was told not to operate any machinery while taking the medication meanwhile emphasized that the pt should take the medication as needed not on a regular basis avoid alcohol intake and avoid other medication that could potentiate its effect, regardless patient was told any other medication given by any other doctor the pt need to call primary care for further advice` patient acknowledged understanding and agreed with today's recommendation        Concern abdout COVID-19 addressed and detailed, pt was told that the best way to prevent illness is by protection, to Wear a facemask , having social distance, and to get tested if possible, Pursuant to the emergency declaration under the [address]  and [address] authority and the [employer] and [employer] Act, this Virtual Visit was conducted, with patient's consent, to reduce the patient's risk of exposure to COVID-19 and provide continuity of care for an established patient. Pt was also told if develop dyspnea needs to call 911 or go to er, call for morgan advise, pt agreed with todays recommendations, Services were provided through a Video synchronous discussion virtually to substitute for in-person appointment.

## 2020-06-08 NOTE — PROGRESS NOTES
Chief Complaint   Patient presents with    Medication Refill     Pt getting medication refill. 1. Have you been to the ER, urgent care clinic since your last visit? Hospitalized since your last visit? No    2. Have you seen or consulted any other health care providers outside of the [address] [place] [address] since your last visit? Include any pap smears or colon screening. No      Pt reported no pain.

## 2020-06-25 ENCOUNTER — APPOINTMENT (OUTPATIENT)
Dept: CT IMAGING | Age: 55
End: 2020-06-25
Attending: EMERGENCY MEDICINE
Payer: COMMERCIAL

## 2020-06-25 ENCOUNTER — HOSPITAL ENCOUNTER (EMERGENCY)
Age: 55
Discharge: HOME | End: 2020-06-25
Attending: EMERGENCY MEDICINE
Payer: COMMERCIAL

## 2020-06-25 VITALS
BODY MASS INDEX: 31.16 KG/M2 | OXYGEN SATURATION: 100 % | WEIGHT: 169.31 LBS | TEMPERATURE: 99.2 F | RESPIRATION RATE: 13 BRPM | HEART RATE: 65 BPM | SYSTOLIC BLOOD PRESSURE: 142 MMHG | HEIGHT: 62 IN | DIASTOLIC BLOOD PRESSURE: 83 MMHG

## 2020-06-25 DIAGNOSIS — R06.02 SOB (SHORTNESS OF BREATH): ICD-10-CM

## 2020-06-25 DIAGNOSIS — R07.9 CHEST PAIN, UNSPECIFIED TYPE: Primary | ICD-10-CM

## 2020-06-25 LAB
ALBUMIN SERPL-MCNC: 4.2 G/DL (ref 3.5–5)
ALBUMIN/GLOB SERPL: 1.1 {RATIO} (ref 1.1–2.2)
ALP SERPL-CCNC: 95 U/L (ref 45–117)
ALT SERPL-CCNC: 34 U/L (ref 12–78)
ANION GAP SERPL CALC-SCNC: 6 MMOL/L (ref 5–15)
AST SERPL-CCNC: 23 U/L (ref 15–37)
ATRIAL RATE: 81 BPM
BASOPHILS # BLD: 0 K/UL (ref 0–0.1)
BASOPHILS NFR BLD: 1 % (ref 0–1)
BILIRUB SERPL-MCNC: 0.3 MG/DL (ref 0.2–1)
BUN SERPL-MCNC: 10 MG/DL (ref 6–20)
BUN/CREAT SERPL: 11 (ref 12–20)
CALCIUM SERPL-MCNC: 8.8 MG/DL (ref 8.5–10.1)
CALCULATED P AXIS, ECG09: 46 DEGREES
CALCULATED R AXIS, ECG10: 14 DEGREES
CALCULATED T AXIS, ECG11: 37 DEGREES
CHLORIDE SERPL-SCNC: 107 MMOL/L (ref 97–108)
CK MB CFR SERPL CALC: 1.2 % (ref 0–2.5)
CK MB SERPL-MCNC: 3.7 NG/ML (ref 5–25)
CK SERPL-CCNC: 305 U/L (ref 26–192)
CO2 SERPL-SCNC: 26 MMOL/L (ref 21–32)
COMMENT, HOLDF: NORMAL
CREAT SERPL-MCNC: 0.9 MG/DL (ref 0.55–1.02)
D DIMER PPP FEU-MCNC: 0.56 MG/L FEU (ref 0–0.65)
DIAGNOSIS, 93000: NORMAL
DIFFERENTIAL METHOD BLD: ABNORMAL
EOSINOPHIL # BLD: 0.2 K/UL (ref 0–0.4)
EOSINOPHIL NFR BLD: 3 % (ref 0–7)
ERYTHROCYTE [DISTWIDTH] IN BLOOD BY AUTOMATED COUNT: 12.7 % (ref 11.5–14.5)
GLOBULIN SER CALC-MCNC: 4 G/DL (ref 2–4)
GLUCOSE SERPL-MCNC: 115 MG/DL (ref 65–100)
HCT VFR BLD AUTO: 37.9 % (ref 35–47)
HGB BLD-MCNC: 13.1 G/DL (ref 11.5–16)
IMM GRANULOCYTES # BLD AUTO: 0 K/UL (ref 0–0.04)
IMM GRANULOCYTES NFR BLD AUTO: 1 % (ref 0–0.5)
LYMPHOCYTES # BLD: 2 K/UL (ref 0.8–3.5)
LYMPHOCYTES NFR BLD: 44 % (ref 12–49)
MCH RBC QN AUTO: 30 PG (ref 26–34)
MCHC RBC AUTO-ENTMCNC: 34.6 G/DL (ref 30–36.5)
MCV RBC AUTO: 86.7 FL (ref 80–99)
MONOCYTES # BLD: 0.4 K/UL (ref 0–1)
MONOCYTES NFR BLD: 9 % (ref 5–13)
NEUTS SEG # BLD: 1.9 K/UL (ref 1.8–8)
NEUTS SEG NFR BLD: 42 % (ref 32–75)
NRBC # BLD: 0 K/UL (ref 0–0.01)
NRBC BLD-RTO: 0 PER 100 WBC
P-R INTERVAL, ECG05: 150 MS
PLATELET # BLD AUTO: 254 K/UL (ref 150–400)
PMV BLD AUTO: 9.4 FL (ref 8.9–12.9)
POTASSIUM SERPL-SCNC: 3.5 MMOL/L (ref 3.5–5.1)
PROT SERPL-MCNC: 8.2 G/DL (ref 6.4–8.2)
Q-T INTERVAL, ECG07: 362 MS
QRS DURATION, ECG06: 82 MS
QTC CALCULATION (BEZET), ECG08: 420 MS
RBC # BLD AUTO: 4.37 M/UL (ref 3.8–5.2)
SAMPLES BEING HELD,HOLD: NORMAL
SODIUM SERPL-SCNC: 139 MMOL/L (ref 136–145)
TROPONIN I SERPL-MCNC: <0.05 NG/ML
VENTRICULAR RATE, ECG03: 81 BPM
WBC # BLD AUTO: 4.4 K/UL (ref 3.6–11)

## 2020-06-25 PROCEDURE — 74011250636 HC RX REV CODE- 250/636: Performed by: EMERGENCY MEDICINE

## 2020-06-25 PROCEDURE — 80053 COMPREHEN METABOLIC PANEL: CPT

## 2020-06-25 PROCEDURE — 85025 COMPLETE CBC W/AUTO DIFF WBC: CPT

## 2020-06-25 PROCEDURE — 74011636320 HC RX REV CODE- 636/320: Performed by: EMERGENCY MEDICINE

## 2020-06-25 PROCEDURE — 84484 ASSAY OF TROPONIN QUANT: CPT

## 2020-06-25 PROCEDURE — 74011250637 HC RX REV CODE- 250/637: Performed by: EMERGENCY MEDICINE

## 2020-06-25 PROCEDURE — 71275 CT ANGIOGRAPHY CHEST: CPT

## 2020-06-25 PROCEDURE — 82553 CREATINE MB FRACTION: CPT

## 2020-06-25 PROCEDURE — 93005 ELECTROCARDIOGRAM TRACING: CPT

## 2020-06-25 PROCEDURE — 99284 EMERGENCY DEPT VISIT MOD MDM: CPT

## 2020-06-25 PROCEDURE — 82550 ASSAY OF CK (CPK): CPT

## 2020-06-25 PROCEDURE — 36415 COLL VENOUS BLD VENIPUNCTURE: CPT

## 2020-06-25 PROCEDURE — 85379 FIBRIN DEGRADATION QUANT: CPT

## 2020-06-25 RX ORDER — HYDROXYZINE 50 MG/1
50 TABLET, FILM COATED ORAL
Qty: 20 TAB | Refills: 0 | Status: SHIPPED | OUTPATIENT
Start: 2020-06-25 | End: 2020-07-05

## 2020-06-25 RX ORDER — GUAIFENESIN 100 MG/5ML
324 LIQUID (ML) ORAL
Status: COMPLETED | OUTPATIENT
Start: 2020-06-25 | End: 2020-06-25

## 2020-06-25 RX ORDER — SODIUM CHLORIDE 0.9 % (FLUSH) 0.9 %
10 SYRINGE (ML) INJECTION
Status: COMPLETED | OUTPATIENT
Start: 2020-06-25 | End: 2020-06-25

## 2020-06-25 RX ADMIN — SODIUM CHLORIDE 500 ML: 900 INJECTION, SOLUTION INTRAVENOUS at 11:07

## 2020-06-25 RX ADMIN — IOPAMIDOL 80 ML: 755 INJECTION, SOLUTION INTRAVENOUS at 10:35

## 2020-06-25 RX ADMIN — Medication 10 ML: at 10:35

## 2020-06-25 RX ADMIN — ASPIRIN 81 MG 324 MG: 81 TABLET ORAL at 08:54

## 2020-06-25 NOTE — ED PROVIDER NOTES
EMERGENCY DEPARTMENT HISTORY AND PHYSICAL EXAM      Date: 6/25/2020  Patient Name: Bridger Alejandre    Please note that this dictation was completed with [employer], the computer voice recognition software. Quite often unanticipated grammatical, syntax, homophones, and other interpretive errors are inadvertently transcribed by the computer software. Please disregard these errors. Please excuse any errors that have escaped final proofreading. History of Presenting Illness     Chief Complaint   Patient presents with    Chest Pain     reports chest tightness x one week; reports hx of anxiety and HTN and took xanax x 2       History Provided By: Patient     HPI: Bridger Alejandre, 48 y.o. female, with a past medical history significant for hypertension and anxiety presenting the emergency department complaining of increasing shortness of breath and intermittent chest discomfort. No unilateral leg pain or leg swelling. No fevers chills or cough. Feels similar to her anxiety. She discussed with her primary care doctor yesterday very a virtual visit. Decided to come in today due to worsening symptoms. No other exacerbating relieving factors. Not associated with exertion. PCP: Lenin Yi MD    No current facility-administered medications on file prior to encounter. Current Outpatient Medications on File Prior to Encounter   Medication Sig Dispense Refill    ALPRAZolam (XANAX) 0.5 mg tablet Take 1 Tab by mouth nightly as needed for Anxiety or Sleep. Max Daily Amount: 0.5 mg. 30 Tab 4    amLODIPine (NORVASC) 10 mg tablet TAKE 1 TABLET BY MOUTH EVERY DAY 90 Tab 1    metoprolol succinate (TOPROL-XL) 50 mg XL tablet Take 1 Tab by mouth nightly. 90 Tab 4    butalbital-acetaminophen-caffeine (FIORICET, ESGIC) -40 mg per tablet Take 1 Tab by mouth every six (6) hours as needed for Headache.  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    Elevated BP 6/6/2016    Elevated heart rate and blood pressure 6/30/2016    HTN, goal below 140/90 9/21/2017    Panic attack as reaction to stress 6/30/2016    Panic disorder with agoraphobia 6/30/2016    Right shoulder pain 1/27/2015    Thyroid nodule        Past Surgical History:  Past Surgical History:   Procedure Laterality Date    HX GYN      HX HYSTERECTOMY  2012    THYROIDECTOMY  2002       Family History:  Family History   Problem Relation Age of Onset    Hypertension Mother     Hypertension Sister        Social History:  Social History     Tobacco Use    Smoking status: Never Smoker    Smokeless tobacco: Never Used   Substance Use Topics    Alcohol use: No    Drug use: No       Allergies:  No Known Allergies      Review of Systems   Review of Systems   Constitutional: Negative for chills and fever. HENT: Negative for congestion and sore throat. Eyes: Negative for visual disturbance. Respiratory: Positive for chest tightness and shortness of breath. Negative for cough. Cardiovascular: Positive for chest pain. Negative for leg swelling. Gastrointestinal: Negative for abdominal pain, blood in stool, diarrhea and nausea. Endocrine: Negative for polyuria. Genitourinary: Negative for dysuria, flank pain, vaginal bleeding and vaginal discharge. Musculoskeletal: Negative for myalgias. Skin: Negative for rash. Allergic/Immunologic: Negative for immunocompromised state. Neurological: Negative for weakness and headaches. Psychiatric/Behavioral: Negative for confusion. Physical Exam   Physical Exam  Vitals signs and nursing note reviewed. Constitutional:       Appearance: She is well-developed. HENT:      Head: Normocephalic and atraumatic. Eyes:      General:         Right eye: No discharge. Left eye: No discharge.       Conjunctiva/sclera: Conjunctivae normal.      Pupils: Pupils are equal, round, and reactive to light. Neck:      Musculoskeletal: Normal range of motion and neck supple. Trachea: No tracheal deviation. Cardiovascular:      Rate and Rhythm: Normal rate and regular rhythm. Heart sounds: Normal heart sounds. No murmur. Pulmonary:      Effort: Pulmonary effort is normal. No respiratory distress. Breath sounds: Normal breath sounds. No wheezing or rales. Abdominal:      General: Bowel sounds are normal.      Palpations: Abdomen is soft. Tenderness: There is no abdominal tenderness. There is no guarding or rebound. Musculoskeletal: Normal range of motion. General: No tenderness or deformity. Skin:     General: Skin is warm and dry. Findings: No erythema or rash. Neurological:      Mental Status: She is alert and oriented to person, place, and time. Psychiatric:         Mood and Affect: Mood is anxious. Behavior: Behavior normal.         Diagnostic Study Results     Labs -     No results found for this or any previous visit (from the past 12 hour(s)). Radiologic Studies -   CTA CHEST W OR W WO CONT   Final Result   IMPRESSION: No acute process or evidence of pulmonary embolism. CT Results  (Last 48 hours)               06/25/20 1036  CTA CHEST W OR W WO CONT Final result    Impression:  IMPRESSION: No acute process or evidence of pulmonary embolism. Narrative:  EXAM:  CTA CHEST W OR W WO CONT       INDICATION:   Chest pain for one week       COMPARISON: None. TECHNIQUE:    Precontrast  images were obtained to localize the volume for acquisition. Multislice helical CT arteriography was performed from the diaphragm to the   thoracic inlet during uneventful rapid bolus of 100 cc Isovue-370. Lung and soft   tissue windows were generated. Coronal and sagittal images were generated and   3D post processing consisting of coronal maximum intensity images was performed.      CT dose reduction was achieved through use of a standardized protocol tailored   for this examination and automatic exposure control for dose modulation. FINDINGS:   CHEST:   THYROID: The left gland is surgically absent. MEDIASTINUM: No mass or lymphadenopathy. BRISSA: No mass or lymphadenopathy. THORACIC AORTA: No dissection or aneurysm. Incidental note is made of a   retroesophageal aberrant right subclavian artery. MAIN PULMONARY ARTERY: There is no evidence of pulmonary embolism. TRACHEA/BRONCHI: Patent. ESOPHAGUS: No wall thickening or dilatation. HEART: Normal in size. PLEURA: No effusion or pneumothorax. LUNGS: No nodule, mass, or airspace disease. INCIDENTALLY IMAGED UPPER ABDOMEN: No focal abnormality. BONES: No destructive bone lesion. CXR Results  (Last 48 hours)    None            Medical Decision Making   I am the first provider for this patient. I reviewed the vital signs, available nursing notes, past medical history, past surgical history, family history and social history. Vital Signs-Reviewed the patient's vital signs. No data found. EKG interpretation: (Preliminary)  NSR rate 81, normal axis, normal intervals. No acute ischemic st or t wave changes. Interpreted by me. Records Reviewed:   Nursing notes, Prior visits     Provider Notes (Medical Decision Making):   Patient presents with CP. DDx:  ACS, Aortic dissection, PNA, PE, PTX, pericarditis, myocarditis, GERD, costochondritis, anxiety. Most likely non cardiac, most likely anxiety given the HPI and Physical exam. Will obtain labs, CXR, EKG. - I have re-examined the patient and the patient denies chest pain on re-examination. The patient has had onset of chest pain greater than 8 hours and one negative set of cardiac enzymes or 2 negative sets of cardiac enzymes in the ER during this visit.   The diagnosis, follow up, return instructions, test results, x-rays and medications have been discussed and reviewed with the patient. The patient has been given the opportunity to ask questions. The patient  expresses understanding of the diagnosis, follow-up and return instructions. The patient agrees to follow up with primary care or cardiology as directed and to return immediately if the chest pain worsens. The patient expresses understanding that although cardiac testing at this time is negative, a cardiac problem could still be present and that a follow-up appointment for further evaluation and risk factor modification is necessary to complete the evaluation of this complaint. ED Course:   Initial assessment performed. The patients presenting problems have been discussed, and they are in agreement with the care plan formulated and outlined with them. I have encouraged them to ask questions as they arise throughout their visit. Critical Care Time:   none    Disposition:  DISCHARGE NOTE  Patients results have been reviewed with them. Patient and/or family have verbally conveyed their understanding and agreement of the patient's signs, symptoms, diagnosis, treatment and prognosis and additionally agree to follow up as recommended or return to the Emergency Room should their condition change or have any new concerns prior to their follow-up appointment. Patient verbally agrees with the care-plan and verbally conveys that all of their questions have been answered. Discharge instructions have also been provided to the patient with some educational information regarding their diagnosis as well a list of reasons why they would want to return to the ER prior to their follow-up appointment should their condition change. PLAN:  1. Discharge Medication List as of 6/25/2020 11:43 AM      START taking these medications    Details   hydrOXYzine HCL (ATARAX) 50 mg tablet Take 1 Tab by mouth every six (6) hours as needed for Anxiety for up to 10 days. , Normal, Disp-20 Tab, R-0 CONTINUE these medications which have NOT CHANGED    Details   ALPRAZolam (XANAX) 0.5 mg tablet Take 1 Tab by mouth nightly as needed for Anxiety or Sleep. Max Daily Amount: 0.5 mg., Normal, Disp-30 Tab, R-4      amLODIPine (NORVASC) 10 mg tablet TAKE 1 TABLET BY MOUTH EVERY DAY, Normal, Disp-90 Tab, R-1      metoprolol succinate (TOPROL-XL) 50 mg XL tablet Take 1 Tab by mouth nightly. , Print, Disp-90 Tab, R-4      butalbital-acetaminophen-caffeine (FIORICET, ESGIC) -40 mg per tablet Take 1 Tab by mouth every six (6) hours as needed for Headache., Print, Disp-20 Tab, R-0           2. Follow-up Information     Follow up With Specialties Details Why Contact Info    Clemencia Greer MD Family Practice Schedule an appointment as soon as possible for a visit  [address]       [facility] EMERGENCY DEPT Emergency Medicine  If symptoms worsen [address] [place] [address] Drive  [address] [facility]  [phone]          Return to ED if worse     Diagnosis     Clinical Impression:   1. Chest pain, unspecified type    2. SOB (shortness of breath)        Attestations:   This note was completed by Dhruv Wilson DO

## 2020-06-25 NOTE — DISCHARGE INSTRUCTIONS
Thank you! Thank you for allowing me to care for you in the emergency department. I sincerely hope that you are satisfied with your visit today. It is my goal to provide you with excellent care. Below you will find a list of your labs and imaging from your visit today. Should you have any questions regarding these results please do not hesitate to call the emergency department. Labs -     Recent Results (from the past 12 hour(s))   EKG, 12 LEAD, INITIAL    Collection Time: 06/25/20  8:20 AM   Result Value Ref Range    Ventricular Rate 81 BPM    Atrial Rate 81 BPM    P-R Interval 150 ms    QRS Duration 82 ms    Q-T Interval 362 ms    QTC Calculation (Bezet) 420 ms    Calculated P Axis 46 degrees    Calculated R Axis 14 degrees    Calculated T Axis 37 degrees    Diagnosis       Normal sinus rhythm  Normal ECG  When compared with ECG of 22-SEP-2019 07:46,  No significant change was found  Confirmed by Naseem Reynoso P.VDolores ([id number]) on 6/25/2020 8:51:47 AM     CBC WITH AUTOMATED DIFF    Collection Time: 06/25/20  8:43 AM   Result Value Ref Range    WBC 4.4 3.6 - 11.0 K/uL    RBC 4.37 3.80 - 5.20 M/uL    HGB 13.1 11.5 - 16.0 g/dL    HCT 37.9 35.0 - 47.0 %    MCV 86.7 80.0 - 99.0 FL    MCH 30.0 26.0 - 34.0 PG    MCHC 34.6 30.0 - 36.5 g/dL    RDW 12.7 11.5 - 14.5 %    PLATELET 149 518 - 765 K/uL    MPV 9.4 8.9 - 12.9 FL    NRBC 0.0 0  WBC    ABSOLUTE NRBC 0.00 0.00 - 0.01 K/uL    NEUTROPHILS 42 32 - 75 %    LYMPHOCYTES 44 12 - 49 %    MONOCYTES 9 5 - 13 %    EOSINOPHILS 3 0 - 7 %    BASOPHILS 1 0 - 1 %    IMMATURE GRANULOCYTES 1 (H) 0.0 - 0.5 %    ABS. NEUTROPHILS 1.9 1.8 - 8.0 K/UL    ABS. LYMPHOCYTES 2.0 0.8 - 3.5 K/UL    ABS. MONOCYTES 0.4 0.0 - 1.0 K/UL    ABS. EOSINOPHILS 0.2 0.0 - 0.4 K/UL    ABS. BASOPHILS 0.0 0.0 - 0.1 K/UL    ABS. IMM.  GRANS. 0.0 0.00 - 0.04 K/UL    DF AUTOMATED     METABOLIC PANEL, COMPREHENSIVE    Collection Time: 06/25/20  8:43 AM   Result Value Ref Range    Sodium 139 136 - 145 mmol/L Potassium 3.5 3.5 - 5.1 mmol/L    Chloride 107 97 - 108 mmol/L    CO2 26 21 - 32 mmol/L    Anion gap 6 5 - 15 mmol/L    Glucose 115 (H) 65 - 100 mg/dL    BUN 10 6 - 20 MG/DL    Creatinine 0.90 0.55 - 1.02 MG/DL    BUN/Creatinine ratio 11 (L) 12 - 20      GFR est AA >60 >60 ml/min/1.73m2    GFR est non-AA >60 >60 ml/min/1.73m2    Calcium 8.8 8.5 - 10.1 MG/DL    Bilirubin, total 0.3 0.2 - 1.0 MG/DL    ALT (SGPT) 34 12 - 78 U/L    AST (SGOT) 23 15 - 37 U/L    Alk. phosphatase 95 45 - 117 U/L    Protein, total 8.2 6.4 - 8.2 g/dL    Albumin 4.2 3.5 - 5.0 g/dL    Globulin 4.0 2.0 - 4.0 g/dL    A-G Ratio 1.1 1.1 - 2.2     CK W/ REFLX CKMB    Collection Time: 06/25/20  8:43 AM   Result Value Ref Range     (H) 26 - 192 U/L   TROPONIN I    Collection Time: 06/25/20  8:43 AM   Result Value Ref Range    Troponin-I, Qt. <0.05 <0.05 ng/mL   SAMPLES BEING HELD    Collection Time: 06/25/20  8:43 AM   Result Value Ref Range    SAMPLES BEING HELD  BLUE     COMMENT        Add-on orders for these samples will be processed based on acceptable specimen integrity and analyte stability, which may vary by analyte. D DIMER    Collection Time: 06/25/20  8:43 AM   Result Value Ref Range    D-dimer 0.56 0.00 - 0.65 mg/L FEU   CK-MB,QUANT. Collection Time: 06/25/20  8:43 AM   Result Value Ref Range    CK - MB 3.7 (H) <3.6 NG/ML    CK-MB Index 1.2 0.0 - 2.5         Radiologic Studies -   CTA CHEST W OR W WO CONT   Final Result   IMPRESSION: No acute process or evidence of pulmonary embolism. CT Results  (Last 48 hours)               06/25/20 1036  CTA CHEST W OR W WO CONT Final result    Impression:  IMPRESSION: No acute process or evidence of pulmonary embolism. Narrative:  EXAM:  CTA CHEST W OR W WO CONT       INDICATION:   Chest pain for one week       COMPARISON: None. TECHNIQUE:    Precontrast  images were obtained to localize the volume for acquisition.    Multislice helical CT arteriography was performed from the diaphragm to the   thoracic inlet during uneventful rapid bolus of 100 cc Isovue-370. Lung and soft   tissue windows were generated. Coronal and sagittal images were generated and   3D post processing consisting of coronal maximum intensity images was performed. CT dose reduction was achieved through use of a standardized protocol tailored   for this examination and automatic exposure control for dose modulation. FINDINGS:   CHEST:   THYROID: The left gland is surgically absent. MEDIASTINUM: No mass or lymphadenopathy. BRISSA: No mass or lymphadenopathy. THORACIC AORTA: No dissection or aneurysm. Incidental note is made of a   retroesophageal aberrant right subclavian artery. MAIN PULMONARY ARTERY: There is no evidence of pulmonary embolism. TRACHEA/BRONCHI: Patent. ESOPHAGUS: No wall thickening or dilatation. HEART: Normal in size. PLEURA: No effusion or pneumothorax. LUNGS: No nodule, mass, or airspace disease. INCIDENTALLY IMAGED UPPER ABDOMEN: No focal abnormality. BONES: No destructive bone lesion. CXR Results  (Last 48 hours)    None             If you feel that you have not received excellent quality care or timely care, please ask to speak to the nurse manager. Please choose us in the future for your continued health care needs. ------------------------------------------------------------------------------------------------------------  The exam and treatment you received in the Emergency Department were for an urgent problem and are not intended as complete care. It is important that you follow up with a doctor, nurse practitioner, or physician assistant for ongoing care. If your symptoms become worse or you do not improve as expected and you are unable to reach your usual health care provider, you should return to the Emergency Department. We are available 24 hours a day.     Please take your discharge instructions with you when you go to your follow-up appointment. If you have any problem arranging a follow-up appointment, contact the Emergency Department immediately. If a prescription has been provided, please have it filled as soon as possible to prevent a delay in treatment. Read the entire medication instruction sheet provided to you by the pharmacy. If you have any questions or reservations about taking the medication due to side effects or interactions with other medications, please call your primary care physician or contact the ER to speak with the charge nurse. Make an appointment with your family doctor or the physician you were referred to for follow-up of this visit as instructed on your discharge paperwork, as this is mandatory follow-up. Return to the ER if you are unable to be seen or if you are unable to be seen in a timely manner. If you have any problem arranging the follow-up visit, contact the Emergency Department immediately. Patient Education        Chest Pain: Care Instructions  Your Care Instructions     There are many things that can cause chest pain. Some are not serious and will get better on their own in a few days. But some kinds of chest pain need more testing and treatment. Your doctor may have recommended a follow-up visit in the next 8 to 12 hours. If you are not getting better, you may need more tests or treatment. Even though your doctor has released you, you still need to watch for any problems. The doctor carefully checked you, but sometimes problems can develop later. If you have new symptoms or if your symptoms do not get better, get medical care right away. If you have worse or different chest pain or pressure that lasts more than 5 minutes or you passed out (lost consciousness), [id number] or seek other emergency help right away. A medical visit is only one step in your treatment.  Even if you feel better, you still need to do what your doctor recommends, such as going to all suggested follow-up appointments and taking medicines exactly as directed. This will help you recover and help prevent future problems. How can you care for yourself at home? · Rest until you feel better. · Take your medicine exactly as prescribed. Call your doctor if you think you are having a problem with your medicine. · Do not drive after taking a prescription pain medicine. When should you call for help? [id number]:   · You passed out (lost consciousness). · You have severe difficulty breathing. · You have symptoms of a heart attack. These may include:  ? Chest pain or pressure, or a strange feeling in your chest.  ? Sweating. ? Shortness of breath. ? Nausea or vomiting. ? Pain, pressure, or a strange feeling in your back, neck, jaw, or upper belly or in one or both shoulders or arms. ? Lightheadedness or sudden weakness. ? A fast or irregular heartbeat. After you call 911, the  may tell you to chew 1 adult-strength or 2 to 4 low-dose aspirin. Wait for an ambulance. Do not try to drive yourself. Call your doctor today if:   · You have any trouble breathing. · Your chest pain gets worse. · You are dizzy or lightheaded, or you feel like you may faint. · You are not getting better as expected. · You are having new or different chest pain. Where can you learn more? Go to http://kings-tavon.info/  Enter A120 in the search box to learn more about \"Chest Pain: Care Instructions. \"  Current as of: June 26, 2019               Content Version: 12.5  © 6204-0169 Healthwise, Incorporated. Care instructions adapted under license by [employer] (which disclaims liability or warranty for this information). If you have questions about a medical condition or this instruction, always ask your healthcare professional. [address] any warranty or liability for your use of this information.          Patient Education        Shortness of Breath: Care Instructions  Your Care Instructions  Shortness of breath has many causes. Sometimes conditions such as anxiety can lead to shortness of breath. Some people get mild shortness of breath when they exercise. Trouble breathing also can be a symptom of a serious problem, such as asthma, lung disease, emphysema, heart problems, and pneumonia. If your shortness of breath continues, you may need tests and treatment. Watch for any changes in your breathing and other symptoms. Follow-up care is a key part of your treatment and safety. Be sure to make and go to all appointments, and call your doctor if you are having problems. It's also a good idea to know your test results and keep a list of the medicines you take. How can you care for yourself at home? · Do not smoke or allow others to smoke around you. If you need help quitting, talk to your doctor about stop-smoking programs and medicines. These can increase your chances of quitting for good. · Get plenty of rest and sleep. · Take your medicines exactly as prescribed. Call your doctor if you think you are having a problem with your medicine. · Find healthy ways to deal with stress. ? Exercise daily. ? Get plenty of sleep. ? Eat regularly and well. When should you call for help? [id number] anytime you think you may need emergency care. For example, call if:  · You have severe shortness of breath. · You have symptoms of a heart attack. These may include:  ? Chest pain or pressure, or a strange feeling in the chest.  ? Sweating. ? Shortness of breath. ? Nausea or vomiting. ? Pain, pressure, or a strange feeling in the back, neck, jaw, or upper belly or in one or both shoulders or arms. ? Lightheadedness or sudden weakness. ? A fast or irregular heartbeat. After you call 911, the  may tell you to chew 1 adult-strength or 2 to 4 low-dose aspirin. Wait for an ambulance. Do not try to drive yourself.   Call your doctor now or seek immediate medical care if:  · Your shortness of breath gets worse or you start to wheeze. Wheezing is a high-pitched sound when you breathe. · You wake up at night out of breath or have to prop your head up on several pillows to breathe. · You are short of breath after only light activity or while at rest.  Watch closely for changes in your health, and be sure to contact your doctor if:  · You do not get better over the next 1 to 2 days. Where can you learn more? Go to http://kings-tavon.info/  Enter S780 in the search box to learn more about \"Shortness of Breath: Care Instructions. \"  Current as of: February 24, 2020               Content Version: 12.5  © 2006-2020 Healthwise, Incorporated. Care instructions adapted under license by [employer] (which disclaims liability or warranty for this information). If you have questions about a medical condition or this instruction, always ask your healthcare professional. [address] any warranty or liability for your use of this information.

## 2020-10-16 RX ORDER — AMLODIPINE BESYLATE 10 MG/1
TABLET ORAL
Qty: 90 TAB | Refills: 1 | Status: SHIPPED | OUTPATIENT
Start: 2020-10-16 | End: 2022-07-29 | Stop reason: SDDI

## 2020-10-24 DIAGNOSIS — K90.9 DIARRHEA DUE TO MALABSORPTION: ICD-10-CM

## 2020-10-24 DIAGNOSIS — I10 HTN, GOAL BELOW 140/90: ICD-10-CM

## 2020-10-24 DIAGNOSIS — F06.4 ANXIETY DISORDER DUE TO KNOWN PHYSIOLOGICAL CONDITION: ICD-10-CM

## 2020-10-24 DIAGNOSIS — F40.01 PANIC DISORDER WITH AGORAPHOBIA: ICD-10-CM

## 2020-10-24 DIAGNOSIS — F43.0 PANIC ATTACK AS REACTION TO STRESS: ICD-10-CM

## 2020-10-24 DIAGNOSIS — R19.7 DIARRHEA DUE TO MALABSORPTION: ICD-10-CM

## 2020-10-24 DIAGNOSIS — F41.0 PANIC ATTACK AS REACTION TO STRESS: ICD-10-CM

## 2020-10-26 RX ORDER — METOPROLOL SUCCINATE 50 MG/1
TABLET, EXTENDED RELEASE ORAL
Qty: 90 TAB | Refills: 4 | Status: SHIPPED | OUTPATIENT
Start: 2020-10-26 | End: 2021-11-26

## 2021-09-02 ENCOUNTER — TRANSCRIBE ORDER (OUTPATIENT)
Dept: SCHEDULING | Age: 56
End: 2021-09-02

## 2021-09-02 DIAGNOSIS — M72.2 PLANTAR FASCIAL FIBROMATOSIS OF RIGHT FOOT: ICD-10-CM

## 2021-09-02 DIAGNOSIS — M19.271: Primary | ICD-10-CM

## 2021-09-02 DIAGNOSIS — M92.61 JUVENILE OSTEOCHONDROSIS OF RIGHT TARSUS: ICD-10-CM

## 2021-09-02 DIAGNOSIS — M65.871 OTHER SYNOVITIS AND TENOSYNOVITIS, RIGHT ANKLE AND FOOT: ICD-10-CM

## 2021-09-16 ENCOUNTER — HOSPITAL ENCOUNTER (OUTPATIENT)
Dept: MRI IMAGING | Age: 56
Discharge: HOME | End: 2021-09-16
Attending: PODIATRIST
Payer: COMMERCIAL

## 2021-09-16 DIAGNOSIS — M65.871 OTHER SYNOVITIS AND TENOSYNOVITIS, RIGHT ANKLE AND FOOT: ICD-10-CM

## 2021-09-16 DIAGNOSIS — M92.61 JUVENILE OSTEOCHONDROSIS OF RIGHT TARSUS: ICD-10-CM

## 2021-09-16 DIAGNOSIS — M72.2 PLANTAR FASCIAL FIBROMATOSIS OF RIGHT FOOT: ICD-10-CM

## 2021-09-16 DIAGNOSIS — M19.271: ICD-10-CM

## 2021-09-16 PROCEDURE — 73718 MRI LOWER EXTREMITY W/O DYE: CPT

## 2021-11-25 DIAGNOSIS — R19.7 DIARRHEA DUE TO MALABSORPTION: ICD-10-CM

## 2021-11-25 DIAGNOSIS — K90.9 DIARRHEA DUE TO MALABSORPTION: ICD-10-CM

## 2021-11-25 DIAGNOSIS — F43.0 PANIC ATTACK AS REACTION TO STRESS: ICD-10-CM

## 2021-11-25 DIAGNOSIS — F40.01 PANIC DISORDER WITH AGORAPHOBIA: ICD-10-CM

## 2021-11-25 DIAGNOSIS — F06.4 ANXIETY DISORDER DUE TO KNOWN PHYSIOLOGICAL CONDITION: ICD-10-CM

## 2021-11-25 DIAGNOSIS — F41.0 PANIC ATTACK AS REACTION TO STRESS: ICD-10-CM

## 2021-11-25 DIAGNOSIS — I10 HTN, GOAL BELOW 140/90: ICD-10-CM

## 2021-11-26 RX ORDER — METOPROLOL SUCCINATE 50 MG/1
TABLET, EXTENDED RELEASE ORAL
Qty: 90 TABLET | Refills: 4 | Status: SHIPPED | OUTPATIENT
Start: 2021-11-26

## 2022-03-19 PROBLEM — F41.9 ANXIETY DISORDER: Status: ACTIVE | Noted: 2018-04-25

## 2022-03-19 PROBLEM — R19.7 DIARRHEA DUE TO MALABSORPTION: Status: ACTIVE | Noted: 2018-04-25

## 2022-03-19 PROBLEM — I10 HTN, GOAL BELOW 140/90: Status: ACTIVE | Noted: 2017-09-21

## 2022-03-19 PROBLEM — K90.9 DIARRHEA DUE TO MALABSORPTION: Status: ACTIVE | Noted: 2018-04-25

## 2022-03-20 PROBLEM — I16.0 HYPERTENSIVE URGENCY: Status: ACTIVE | Noted: 2019-09-22

## 2022-07-27 ENCOUNTER — OFFICE VISIT (OUTPATIENT)
Dept: FAMILY MEDICINE CLINIC | Age: 57
End: 2022-07-27
Payer: COMMERCIAL

## 2022-07-27 VITALS — BODY MASS INDEX: 30.25 KG/M2 | SYSTOLIC BLOOD PRESSURE: 178 MMHG | WEIGHT: 165.4 LBS | DIASTOLIC BLOOD PRESSURE: 69 MMHG

## 2022-07-27 DIAGNOSIS — F40.01 PANIC DISORDER WITH AGORAPHOBIA: ICD-10-CM

## 2022-07-27 DIAGNOSIS — F41.0 PANIC ATTACK AS REACTION TO STRESS: ICD-10-CM

## 2022-07-27 DIAGNOSIS — I10 HTN, GOAL BELOW 140/90: ICD-10-CM

## 2022-07-27 DIAGNOSIS — F06.4 ANXIETY DISORDER DUE TO KNOWN PHYSIOLOGICAL CONDITION: ICD-10-CM

## 2022-07-27 DIAGNOSIS — F43.0 PANIC ATTACK AS REACTION TO STRESS: ICD-10-CM

## 2022-07-27 LAB
ALBUMIN SERPL-MCNC: 4.4 G/DL (ref 3.5–5)
ALBUMIN/GLOB SERPL: 1.4 {RATIO} (ref 1.1–2.2)
ALP SERPL-CCNC: 88 U/L (ref 45–117)
ALT SERPL-CCNC: 31 U/L (ref 12–78)
ANION GAP SERPL CALC-SCNC: 7 MMOL/L (ref 5–15)
AST SERPL-CCNC: 25 U/L (ref 15–37)
BASOPHILS # BLD: 0 K/UL (ref 0–0.1)
BASOPHILS NFR BLD: 1 % (ref 0–1)
BILIRUB SERPL-MCNC: 0.3 MG/DL (ref 0.2–1)
BUN SERPL-MCNC: 16 MG/DL (ref 6–20)
BUN/CREAT SERPL: 17 (ref 12–20)
CALCIUM SERPL-MCNC: 9.7 MG/DL (ref 8.5–10.1)
CHLORIDE SERPL-SCNC: 110 MMOL/L (ref 97–108)
CHOLEST SERPL-MCNC: 207 MG/DL
CO2 SERPL-SCNC: 26 MMOL/L (ref 21–32)
CREAT SERPL-MCNC: 0.92 MG/DL (ref 0.55–1.02)
DIFFERENTIAL METHOD BLD: NORMAL
EOSINOPHIL # BLD: 0.1 K/UL (ref 0–0.4)
EOSINOPHIL NFR BLD: 3 % (ref 0–7)
ERYTHROCYTE [DISTWIDTH] IN BLOOD BY AUTOMATED COUNT: 12.9 % (ref 11.5–14.5)
EST. AVERAGE GLUCOSE BLD GHB EST-MCNC: 126 MG/DL
FERRITIN SERPL-MCNC: 76 NG/ML (ref 8–252)
GLOBULIN SER CALC-MCNC: 3.2 G/DL (ref 2–4)
GLUCOSE SERPL-MCNC: 104 MG/DL (ref 65–100)
HBA1C MFR BLD: 6 % (ref 4–5.6)
HCT VFR BLD AUTO: 41.3 % (ref 35–47)
HDLC SERPL-MCNC: 73 MG/DL
HDLC SERPL: 2.8 {RATIO} (ref 0–5)
HGB BLD-MCNC: 13.9 G/DL (ref 11.5–16)
IMM GRANULOCYTES # BLD AUTO: 0 K/UL (ref 0–0.04)
IMM GRANULOCYTES NFR BLD AUTO: 0 % (ref 0–0.5)
LDLC SERPL CALC-MCNC: 109.4 MG/DL (ref 0–100)
LYMPHOCYTES # BLD: 1.5 K/UL (ref 0.8–3.5)
LYMPHOCYTES NFR BLD: 39 % (ref 12–49)
MCH RBC QN AUTO: 30.8 PG (ref 26–34)
MCHC RBC AUTO-ENTMCNC: 33.7 G/DL (ref 30–36.5)
MCV RBC AUTO: 91.4 FL (ref 80–99)
MONOCYTES # BLD: 0.4 K/UL (ref 0–1)
MONOCYTES NFR BLD: 9 % (ref 5–13)
NEUTS SEG # BLD: 1.9 K/UL (ref 1.8–8)
NEUTS SEG NFR BLD: 48 % (ref 32–75)
NRBC # BLD: 0 K/UL (ref 0–0.01)
NRBC BLD-RTO: 0 PER 100 WBC
PLATELET # BLD AUTO: 272 K/UL (ref 150–400)
PMV BLD AUTO: 10.3 FL (ref 8.9–12.9)
POTASSIUM SERPL-SCNC: 4 MMOL/L (ref 3.5–5.1)
PROT SERPL-MCNC: 7.6 G/DL (ref 6.4–8.2)
RBC # BLD AUTO: 4.52 M/UL (ref 3.8–5.2)
SODIUM SERPL-SCNC: 143 MMOL/L (ref 136–145)
T4 FREE SERPL-MCNC: 0.9 NG/DL (ref 0.8–1.5)
TRIGL SERPL-MCNC: 123 MG/DL (ref ?–150)
TSH SERPL DL<=0.05 MIU/L-ACNC: 1.19 UIU/ML (ref 0.36–3.74)
VLDLC SERPL CALC-MCNC: 24.6 MG/DL
WBC # BLD AUTO: 3.9 K/UL (ref 3.6–11)

## 2022-07-27 PROCEDURE — 99214 OFFICE O/P EST MOD 30 MIN: CPT | Performed by: FAMILY MEDICINE

## 2022-07-27 RX ORDER — VENLAFAXINE HYDROCHLORIDE 37.5 MG/1
37.5 CAPSULE, EXTENDED RELEASE ORAL DAILY
Qty: 30 CAPSULE | Refills: 1 | Status: SHIPPED | OUTPATIENT
Start: 2022-07-27 | End: 2022-08-18 | Stop reason: SDUPTHER

## 2022-07-27 RX ORDER — CHLORTHALIDONE 25 MG/1
25 TABLET ORAL DAILY
Qty: 30 TABLET | Refills: 3 | Status: SHIPPED | OUTPATIENT
Start: 2022-07-27 | End: 2022-08-29 | Stop reason: SDUPTHER

## 2022-07-27 RX ORDER — ALPRAZOLAM 0.5 MG/1
0.5 TABLET ORAL
Qty: 30 TABLET | Refills: 4 | Status: CANCELLED | OUTPATIENT
Start: 2022-07-27

## 2022-07-27 RX ORDER — ETODOLAC 400 MG/1
400 TABLET, FILM COATED ORAL
Qty: 30 TABLET | Refills: 1 | Status: SHIPPED | OUTPATIENT
Start: 2022-07-27 | End: 2022-09-27

## 2022-07-27 NOTE — PROGRESS NOTES
Chief Complaint   Patient presents with    Back Pain        1. Have you been to the ER, urgent care clinic since your last visit? Hospitalized since your last visit? No    2. Have you seen or consulted any other health care providers outside of the [address] [place] [address] since your last visit? Include any pap smears or colon screening. No      Verified patient with two type of identifiers. c/o upper back pain x 3 weeks, sometimes radiates to chest.  unsure of injury,  using tylenol arthritis with relief.

## 2022-07-27 NOTE — LETTER
Name:.Rhonda Lorenzo   [facility]:[birth year]   MR #:[id number]   Provider Subha Smith MD   *[id number]*  BSMG-491 (5/16)  Page 1 of 5 Initial [place]    CONTROLLED SUBSTANCE AGREEMENT    I may be prescribed medications that are controlled substances as part  of my treatment plan for management of my medical condition(s). The goal of my treatment plan is to maintain and/or improve my health and wellbeing. Because controlled substances have an increased risk of abuse or harm, continual re-evaluation is needed determine if the goals of my treatment plan are being met for my safety and the safety of others. Madalyn Mandi  am entering into this Controlled Substance Agreement with my provider, Lenin Yi MD at [address] [place] [address] . I understand that successful treatment requires mutual trust and honesty between me and my provider. I understand that there are state and federal laws and regulations which apply to the medications that my provider may prescribe that must be followed. I understand there are risks and benefits ts of taking the medicines that my provider may prescribe. I understand and agree that following this Agreement is necessary in continuing my provider-patient relationship and success of my treatment plan. As a part of my treatment plan, I agree to the following:    COMMUNICATION:    1. I will communicate fully with my provider about my medical condition(s), including the effect on my daily life and how well my medications are helping. I will tell my provider all of the medications that I take for any reason, including medications I receive from another health care provider, and will notify my provider about all issues, problems or concerns, including any side effects, which may be related to my medications. I understand that this information allows my provider to adjust my treatment plan to help manage my medical condition.  I understand that this information will become part of my permanent medical record. 2. I will notify my provider if I have a history of alcohol/drug misuse/addiction or if I have had treatment for alcohol/drug addiction in the past, or if I have a new problem with or concern about alcohol/drug use/addiction, because this increases the likelihood of high risk behaviors and may lead to serious medical conditions. 3. Females Only: I will notify my provider if I am or become pregnant, or if I intend to become pregnant, or if I intend to breastfeed. I understand that communication of these issues with my provider is important, due to possible effects my medication could have on an unborn fetus or breastfeeding child. Name:.Rhonda Wen   SN:[id number]   MR #:[id number]   Provider Sarah Lujan MD   *[id number]*  BSMG-491 (5/16)  Page 2 of 5 Initial [facility]      MISUSE OF MEDICATIONS / DRUGS:    1. I agree to take all controlled substances as prescribed, and will not misuse or abuse any controlled substances prescribed by my provider. For my safety, I will not increase the amount of medicine I take without first talking with and getting permission from my provider. 2. If I have a medical emergency, another health care provider may prescribe me medication. If I seek emergency treatment, I will notify my provider within seventy-two (72) hours. 3. I understand that my provider may discuss my use and/or possible misuse/abuse of controlled substances and alcohol, as appropriate, with any health care provider involved in my care, pharmacist or legal authority. ILLEGAL DRUGS:    1. I will not use illegal drugs of any kind, including but not limited to marijuana, heroin, cocaine, or any prescription drug which is not prescribed to me. DRUG DIVERSION / PRESCRIPTION FRAUD:    1. I will not share, sell, trade, give away, or otherwise misuse my prescriptions or medications.     2. I will not alter any prescriptions provided to me by my provider. SINGLE PROVIDER:    1. I agree that all controlled substances that I take will be prescribed only by my provider (or his/her covering provider) under this Agreement. This agreement does not prevent me from seeking emergency medical treatment or receiving pain management related to a surgery. PROTECTING MEDICATIONS:    1. I am responsible for keeping my prescriptions and medications in a safe and secure place including safeguarding them from loss or theft. I understand that lost, stolen or damaged/destroyed prescriptions or medications will not be replaced. Name:.Rhonda Leung   JHOANA:[id number]   MR #:[id number]   Provider Leonel Seaman MD   *[id number]*  BSMG-491 (5/16)  Page 3 of 5 Initial [place]  PRESCRIPTION RENEWALS/REFILLS:    1. I will follow my controlled substance medication schedule as prescribed by my provider. 2. I understand and agree that I will make any requests for renewals or refills of my prescriptions only at the time of an office visit or during my providers regular office hours subject to the prescription refill requirements of the individual practice. 3. I understand that my provider may not call in prescriptions for controlled substances to my pharmacy. 4. I understand that my provider may adjust or discontinue these medications as deemed appropriate for my medical treatment plan. This Agreement does not guarantee the prescription of controlled medications. 5. I agree that if my medications are adjusted or discontinued, I will properly dispose of any remaining medications. I understand that I will be required to dispose of any remaining controlled medications prior to being provided with any prescriptions for other controlled medications. 6. I understand that the renewal of my prescription depends on my medical condition, my consistent participation, and my adherence with my treatment plan and this Agreement.     7. I understand that if I do not keep an appointment with my provider, I may not receive a renewal or refill for my controlled substance medication. PRESCRIPTION MONITORING / DRUG TESTIN. I understand that my provider may require me to provide urine, saliva or blood for testing at any time. I understand that this testing will be used to monitor for safety and adherence with my treatment plan and this Agreement. 2. I understand that my provider may ask me to provide an observed urine specimen, which means that a nurse or other health care provider may watch me provide urine, and I agree to cooperate if I am asked to provide an observed specimen. 3. I understand that if I do not provide urine, saliva or blood samples within two (2) hours of my providers request, or other timeframe decided by my provider, my treatment plan could be changed, or my prescriptions and medications may be changed or ended. 4. I understand that urine, saliva and blood test results will be a part of my permanent medical record. Name:Nixon Orr   [facility]:   MR #:[id number]   Provider Amirah Evangelista MD   *[id number]*  BSMG-491 ()  Page 4 of 5 Initial SMARTworks    5. I understand that my provider is required to obtain a copy of my State Prescription Monitoring Program () Report at any time in order to safely prescribe medications. 6. I will bring all of my prescribed controlled substance medications in their original bottles to all of my scheduled appointments. 7. I understand that my provider may ask me to come to the practice with all of my prescribed medications for a random pill count at any time. I agree to cooperate if I am asked to come in for a random pill count. I understand that if I do not arrive in the timeframe decided by my provider, my treatment plan could be changed, or my prescriptions and medications may be changed or ended.     COOPERATION WITH INVESTIGATIONS:    1. I authorize my provider and my pharmacy to cooperate fully with any local, state, or federal law enforcement agency in the investigation of any possible misuse, sale, or other diversion of my controlled substance prescriptions or medications. RISKS:    1. I understand that my level of consciousness may be affected from the use of controlled substances, and I understand that there are risks, benefits, effects and potential alternatives (including no treatment) to the medications that my provider has prescribed. 2. I understand that I may become drowsy, tired, dizzy, constipated, and sick to my stomach, or have changes in my mood or in my sleep while taking my medications. I have talked with my provider about these possible side effects, risks, benefits, and alternative treatments, and my provider has answered all of my questions. 3. I understand that I should not suddenly stop taking my medications without first speaking with my provider. I understand that if I suddenly stop taking my medications, I may experience nausea, vomiting, sweating,anxiety, sleeplessness, itching or other uncomfortable feelings. 4. I will not take my medications with alcohol of any kind, including beer, wine or liquor. I understand that drinking alcohol with my medications increases the chances of side effects, including breathing problems or even death. 5. I understand that if I have a history of alcoholism or other drug addiction I may be at increased risk of addiction to my medications. Signs of addiction might include craving, compulsive use, and continued use despite harm. Since addiction is a disease, I understand my provider may decide to change my medications and refer me to appropriate treatment services. I understand that this information would become part of my permanent medical record.     Name:.Rhonda Mdarid   GVJ:[birth year]   MR #:[id number]   Provider Harsh Mukherjee MD   *[id number]*  BSMG-491 (5/16)  Page 5 of 5 Initial SMARTworks      6. Females only: Children born to women who regularly take controlled substances are likely to have physical problems and suffer withdrawal symptoms at birth. If I am of child-bearing age, I understand that I should use safe and effective birth control while taking any controlled substances to avoid the impact of medications on an unborn fetus or  child. I agree to notify my provider immediately if I should become pregnant so that my treatment plan can be adjusted. 7. Males only: I understand that chronic use of controlled substances has been associated with low testosterone levels in males which may affect my mood, stamina, sexual desire, and general health. I understand that my provider may order the appropriate laboratory test to determine my testosterone level,and I agree to this testing. ADHERENCE:    1. I understand that if I do not adhere to this Agreement in any way, my provider may change my prescriptions, stop prescribing controlled substances or end our provider-patient relationship. 2. If my provider decides to stop prescribing medication, or decides to end our provider-patient relationship,my provider may require that I taper my medications slowly. If necessary, my provider may also provide a prescription for other medications to treat my withdrawal symptoms. UNDERSTANDING THIS AGREEMENT:    I understand that my provider may adjust or stop my prescriptions for controlled substances based on my medical condition and my treatment plan. I understand that this Agreement does not guarantee that I will be prescribed medications or controlled substances. I understand that controlled substances may be just one part  of my treatment plan. My initial on each page and my signature below shows that I have read each page of this Agreement, I have had an opportunity to ask questions, and all of my questions have been answered to my satisfaction by my provider.     By signing below, I agree to comply with this Agreement, and I understand that if I do not follow the Agreements listed above, my provider may stop        _________________________________________  Date/Time 7/27/2022 10:37 AM                 (Patient Signature)

## 2022-07-27 NOTE — PROGRESS NOTES
HISTORY OF PRESENT ILLNESS  Jess Campbell is a 49 y.o. female, presents stating that she Just got separted , had her own place kids are grown up, having 2 jobs,      present stating that Patient's current medical condition is not controlled with medications,   pt states and reports of feeling anxius, but not depressed, nicely dressed upsome guilty feeling, no Hoplessness, patient at this time has ns/nh,ni,nh, and some trouble with weight gain,having ok ability of sleep, okay ablitiy to concentrate at home but not at work with the current medications, and all together a safe feeling at home,       pt also present for Bp check,     HTN   pt also present for Bp check , compliant w/ the bp meds, again not sure why patient is on beta-blocker at this age , a low salt diet, an  active life style, patient does obtain the bp at home, today the pt denies Chest Pain, has no legs swelling no lightheadedness,      Currently on metoprolol,   Starts her day at 1:30 am, till 8pm  Not taken it since 2020, ++ HOT FLASHES  HAS HD hysterectomy in 2012, she states that she does not like to take medication and like to be medicated,          Current Outpatient Medications   Medication Sig Dispense Refill    metoprolol succinate (TOPROL-XL) 50 mg XL tablet TAKE 1 TABLET BY MOUTH EVERY DAY AT NIGHT 90 Tablet 4    amLODIPine (NORVASC) 10 mg tablet TAKE 1 TABLET BY MOUTH EVERY DAY (Patient not taking: Reported on 7/27/2022) 90 Tab 1    ALPRAZolam (XANAX) 0.5 mg tablet Take 1 Tab by mouth nightly as needed for Anxiety or Sleep. Max Daily Amount: 0.5 mg. (Patient not taking: Reported on 7/27/2022) 30 Tab 4    butalbital-acetaminophen-caffeine (FIORICET, ESGIC) -40 mg per tablet Take 1 Tab by mouth every six (6) hours as needed for Headache.  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    Elevated BP 6/6/2016    Elevated heart rate and blood pressure 6/30/2016    HTN, goal below 140/90 9/21/2017    Panic attack as reaction to stress 6/30/2016    Panic disorder with agoraphobia 6/30/2016    Right shoulder pain 1/27/2015    Thyroid nodule      Past Surgical History:   Procedure Laterality Date    HX GYN      HX HYSTERECTOMY  2012    SC THYROIDECTOMY  2002     Family History   Problem Relation Age of Onset    Hypertension Mother     Hypertension Sister      Social History     Tobacco Use    Smoking status: Never    Smokeless tobacco: Never   Substance Use Topics    Alcohol use: No      Lab Results   Component Value Date/Time    Hemoglobin A1c 6.1 (H) 09/20/2019 04:19 PM    Glucose 115 (H) 06/25/2020 08:43 AM    LDL, calculated 116 (H) 03/30/2011 04:19 PM    Creatinine 0.90 06/25/2020 08:43 AM      Lab Results   Component Value Date/Time    Cholesterol, total 206 (H) 09/20/2019 04:19 PM    HDL Cholesterol 68 09/20/2019 04:19 PM    LDL, calculated 116 (H) 03/30/2011 04:19 PM    Triglyceride 46 03/30/2011 04:19 PM        Review of Systems   Constitutional:  Negative for chills and fever. HENT:  Negative for congestion and nosebleeds. Eyes:  Negative for blurred vision and pain. Respiratory:  Negative for cough, shortness of breath and wheezing. Cardiovascular:  Negative for chest pain and leg swelling. Gastrointestinal:  Negative for constipation, diarrhea, nausea and vomiting. Genitourinary:  Negative for dysuria and frequency. Musculoskeletal:  Negative for joint pain and myalgias. Skin:  Negative for itching and rash. Neurological:  Negative for dizziness, loss of consciousness and headaches. Psychiatric/Behavioral:  Negative for depression. The patient is not nervous/anxious and does not have insomnia. Physical Exam  Vitals and nursing note reviewed. Constitutional:       Appearance: She is well-developed.    HENT:      Head: Normocephalic and atraumatic. Eyes:      Conjunctiva/sclera: Conjunctivae normal.      Pupils: Pupils are equal, round, and reactive to light. Neck:      Thyroid: No thyromegaly. Vascular: No JVD. Cardiovascular:      Rate and Rhythm: Normal rate and regular rhythm. Heart sounds: Normal heart sounds. No murmur heard. No friction rub. No gallop. Pulmonary:      Effort: Pulmonary effort is normal. No respiratory distress. Breath sounds: Normal breath sounds. No stridor. No wheezing or rales. Abdominal:      General: Bowel sounds are normal. There is no distension. Palpations: Abdomen is soft. There is no mass. Tenderness: There is no abdominal tenderness. Musculoskeletal:         General: No tenderness. Normal range of motion. Lymphadenopathy:      Cervical: No cervical adenopathy. Skin:     Findings: No erythema or rash. Neurological:      Mental Status: She is alert and oriented to person, place, and time. Cranial Nerves: No cranial nerve deficit. Deep Tendon Reflexes: Reflexes are normal and symmetric. Psychiatric:         Behavior: Behavior normal.       ASSESSMENT and PLAN    ICD-10-CM ICD-9-CM    1. Panic attack as reaction to stress  F41.0 308.0 CBC WITH AUTOMATED DIFF    F43.0  HEMOGLOBIN A1C WITH EAG      METABOLIC PANEL, COMPREHENSIVE      TSH 3RD GENERATION      T4, FREE      FERRITIN      LIPID PANEL      LIPID PANEL      T4, FREE      FERRITIN      TSH 3RD GENERATION      METABOLIC PANEL, COMPREHENSIVE      HEMOGLOBIN A1C WITH EAG      CBC WITH AUTOMATED DIFF      CANCELED: CBC W/O DIFF      2.  Panic disorder with agoraphobia  F40.01 300.21 CBC WITH AUTOMATED DIFF      HEMOGLOBIN A1C WITH EAG      METABOLIC PANEL, COMPREHENSIVE      TSH 3RD GENERATION      T4, FREE      FERRITIN      LIPID PANEL      LIPID PANEL      T4, FREE      FERRITIN      TSH 3RD GENERATION      METABOLIC PANEL, COMPREHENSIVE      HEMOGLOBIN A1C WITH EAG      CBC WITH AUTOMATED DIFF CANCELED: CBC W/O DIFF      3. Anxiety disorder due to known physiological condition  F06.4 300.00 CBC WITH AUTOMATED DIFF      HEMOGLOBIN A1C WITH EAG      METABOLIC PANEL, COMPREHENSIVE      TSH 3RD GENERATION      T4, FREE      FERRITIN      LIPID PANEL      LIPID PANEL      T4, FREE      FERRITIN      TSH 3RD GENERATION      METABOLIC PANEL, COMPREHENSIVE      HEMOGLOBIN A1C WITH EAG      CBC WITH AUTOMATED DIFF      CANCELED: CBC W/O DIFF      4.  HTN, goal below 140/90  I10 401.9 CBC WITH AUTOMATED DIFF      HEMOGLOBIN A1C WITH EAG      METABOLIC PANEL, COMPREHENSIVE      TSH 3RD GENERATION      T4, FREE      FERRITIN      LIPID PANEL      LIPID PANEL      T4, FREE      FERRITIN      TSH 3RD GENERATION      METABOLIC PANEL, COMPREHENSIVE      HEMOGLOBIN A1C WITH EAG      CBC WITH AUTOMATED DIFF      CANCELED: CBC W/O DIFF        lab results and schedule of future lab studies reviewed with patient  HDL Of 73 LDL of 109, just significant for A1c of 6 percentile  Continue with lifestyle modification repeat in 3 months blood pressure need to be repeated in 1 to 2 weeks patient advised to return to clinic for further blood pressure monitoring

## 2022-08-17 ENCOUNTER — OFFICE VISIT (OUTPATIENT)
Dept: FAMILY MEDICINE CLINIC | Age: 57
End: 2022-08-17
Payer: COMMERCIAL

## 2022-08-17 VITALS
DIASTOLIC BLOOD PRESSURE: 87 MMHG | OXYGEN SATURATION: 97 % | HEART RATE: 108 BPM | BODY MASS INDEX: 30 KG/M2 | WEIGHT: 164 LBS | RESPIRATION RATE: 18 BRPM | SYSTOLIC BLOOD PRESSURE: 145 MMHG | TEMPERATURE: 97 F

## 2022-08-17 DIAGNOSIS — I10 HTN, GOAL BELOW 140/90: Primary | ICD-10-CM

## 2022-08-17 DIAGNOSIS — Z11.59 NEED FOR HEPATITIS C SCREENING TEST: ICD-10-CM

## 2022-08-17 DIAGNOSIS — Z12.39 ENCOUNTER FOR SCREENING BREAST EXAMINATION: ICD-10-CM

## 2022-08-17 DIAGNOSIS — Z12.31 ENCOUNTER FOR SCREENING MAMMOGRAM FOR MALIGNANT NEOPLASM OF BREAST: ICD-10-CM

## 2022-08-17 DIAGNOSIS — Z12.11 SCREENING FOR COLON CANCER: ICD-10-CM

## 2022-08-17 PROCEDURE — 99213 OFFICE O/P EST LOW 20 MIN: CPT | Performed by: FAMILY MEDICINE

## 2022-08-17 RX ORDER — ZOSTER VACCINE RECOMBINANT, ADJUVANTED 50 MCG/0.5
0.5 KIT INTRAMUSCULAR ONCE
Qty: 0.5 ML | Refills: 0 | Status: SHIPPED | OUTPATIENT
Start: 2022-08-17 | End: 2022-08-29 | Stop reason: SDUPTHER

## 2022-08-17 NOTE — PROGRESS NOTES
Chief Complaint   Patient presents with    Hypertension       1. Have you been to the ER, urgent care clinic since your last visit? Hospitalized since your last visit? No    2. Have you seen or consulted any other health care providers outside of the [address] [place] [address] since your last visit? Include any pap smears or colon screening. No    Health Maintenance   Topic Date Due    Hepatitis C Screening  Never done    COVID-19 Vaccine (1) Never done    Shingrix Vaccine Age 50> (1 of 2) Never done    Colorectal Cancer Screening Combo  05/30/2019    Breast Cancer Screen Mammogram  06/28/2020    DTaP/Tdap/Td series (2 - Td or Tdap) 03/30/2021    Flu Vaccine (1) 09/01/2022    Depression Screen  07/27/2023    A1C test (Diabetic or Prediabetic)  07/27/2023    Lipid Screen  07/27/2027    Pneumococcal 0-64 years  Aged Out     . Verified patient with two type of identifiers.   requesting to discuss last lab results

## 2022-08-17 NOTE — PROGRESS NOTES
HISTORY OF PRESENT ILLNESS  Steven London is a 49 y.o. female, present for Bp check , compliant w/ the bp meds, a low salt diet, an  active life style, patient doesnot obtain the bp at home,   today the pt denies Chest Pain, has no legs swelling no lightheadedness,    HDL Cholesterol MG/DL 73     Hemoglobin A1c 4.0 - 5.6 % 6.0 High   6.1 High     post menopausal  +++ improved the hot flashes was told to take the premarin . 3 mg every other day helping having less vaginal dryness, better sleep and less fatigue with improved dyspareunia,         Current Outpatient Medications   Medication Sig Dispense Refill    chlorthalidone (HYGROTON) 25 mg tablet Take 1 Tablet by mouth in the morning. 30 Tablet 3    venlafaxine-SR (EFFEXOR-XR) 37.5 mg capsule Take 1 Capsule by mouth in the morning. 30 Capsule 1    etodolac (LODINE) 400 mg tablet Take 1 Tablet by mouth daily as needed for Pain.  30 Tablet 1    metoprolol succinate (TOPROL-XL) 50 mg XL tablet TAKE 1 TABLET BY MOUTH EVERY DAY AT NIGHT 90 Tablet 4     No Known Allergies  Past Medical History:   Diagnosis Date    Anxiety disorder 4/25/2018    Cervical pain (neck) 1/27/2015    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    Elevated BP 6/6/2016    Elevated heart rate and blood pressure 6/30/2016    HTN, goal below 140/90 9/21/2017    Panic attack as reaction to stress 6/30/2016    Panic disorder with agoraphobia 6/30/2016    Right shoulder pain 1/27/2015    Thyroid nodule      Past Surgical History:   Procedure Laterality Date    HX GYN      HX HYSTERECTOMY  2012    MA THYROIDECTOMY  2002     Family History   Problem Relation Age of Onset    Hypertension Mother     Hypertension Sister      Social History     Tobacco Use    Smoking status: Never    Smokeless tobacco: Never   Substance Use Topics    Alcohol use: No      Lab Results   Component Value Date/Time    Hemoglobin A1c 6.0 (H) 07/27/2022 11:49 AM    Hemoglobin A1c 6.1 (H) 09/20/2019 04:19 PM    Glucose 104 (H) 07/27/2022 11:49 AM    LDL, calculated 109.4 (H) 07/27/2022 11:49 AM    Creatinine 0.92 07/27/2022 11:49 AM      Lab Results   Component Value Date/Time    Cholesterol, total 207 (H) 07/27/2022 11:49 AM    HDL Cholesterol 73 07/27/2022 11:49 AM    LDL, calculated 109.4 (H) 07/27/2022 11:49 AM    Triglyceride 123 07/27/2022 11:49 AM    CHOL/HDL Ratio 2.8 07/27/2022 11:49 AM     Lab Results   Component Value Date/Time    ALT (SGPT) 31 07/27/2022 11:49 AM    Alk. phosphatase 88 07/27/2022 11:49 AM    Bilirubin, total 0.3 07/27/2022 11:49 AM    Albumin 4.4 07/27/2022 11:49 AM    Protein, total 7.6 07/27/2022 11:49 AM    PLATELET 431 28/66/8321 11:49 AM        Review of Systems   Constitutional:  Negative for chills and fever. HENT:  Negative for congestion and nosebleeds. Eyes:  Negative for blurred vision and pain. Respiratory:  Negative for cough, shortness of breath and wheezing. Cardiovascular:  Negative for chest pain and leg swelling. Gastrointestinal:  Negative for constipation, diarrhea, nausea and vomiting. Genitourinary:  Negative for dysuria and frequency. Musculoskeletal:  Negative for joint pain and myalgias. Skin:  Negative for itching and rash. Neurological:  Negative for dizziness, loss of consciousness and headaches. Psychiatric/Behavioral:  Negative for depression. The patient is not nervous/anxious and does not have insomnia. Physical Exam  Vitals and nursing note reviewed. Constitutional:       Appearance: She is well-developed. HENT:      Head: Normocephalic and atraumatic. Eyes:      Conjunctiva/sclera: Conjunctivae normal.      Pupils: Pupils are equal, round, and reactive to light. Neck:      Thyroid: No thyromegaly. Vascular: No JVD. Cardiovascular:      Rate and Rhythm: Normal rate and regular rhythm. Heart sounds: Normal heart sounds. No murmur heard. No friction rub. No gallop.    Pulmonary:      Effort: Pulmonary effort is normal. No respiratory distress. Breath sounds: Normal breath sounds. No stridor. No wheezing or rales. Abdominal:      General: Bowel sounds are normal. There is no distension. Palpations: Abdomen is soft. There is no mass. Tenderness: There is no abdominal tenderness. Musculoskeletal:         General: No tenderness. Normal range of motion. Lymphadenopathy:      Cervical: No cervical adenopathy. Skin:     Findings: No erythema or rash. Neurological:      Mental Status: She is alert and oriented to person, place, and time. Cranial Nerves: No cranial nerve deficit. Deep Tendon Reflexes: Reflexes are normal and symmetric. Psychiatric:         Behavior: Behavior normal.       ASSESSMENT and PLAN    ICD-10-CM ICD-9-CM    1. HTN, goal below 140/90  I10 401.9 HEPATITIS PANEL, ACUTE      chlorthalidone (HYGROTON) 25 mg tablet      2. Encounter for screening mammogram for malignant neoplasm of breast  Z12.31 V76.12 [facility] MAMMO BI SCREENING INCL CAD      3. Screening for colon cancer  Z12.11 V76.51 REFERRAL FOR COLONOSCOPY      4. Encounter for screening breast examination  Z12.39 V76.10       5.  Need for hepatitis C screening test  Z11.59 V73.89 HEPATITIS PANEL, ACUTE        lab results and schedule of future lab studies reviewed with patient  reviewed medications and side effects in detail

## 2022-08-18 NOTE — TELEPHONE ENCOUNTER
90 day request sent for patients effexor xr 37.5mg      Last visit:8/17/22  Next visit:1/11/23  Previous refill 7/27/22(90+1R)    Requested Prescriptions     Pending Prescriptions Disp Refills    venlafaxine-SR (EFFEXOR-XR) 37.5 mg capsule 90 Capsule 1     Sig: Take 1 Capsule by mouth daily. Take 1 Capsule by mouth in the morning. For [id number] in place:   Recommendation Provided To:    Intervention Detail: New Rx: 1, reason: Patient Preference  Gap Closed?:   Intervention Accepted By:   Time Spent (min): 5

## 2022-08-19 RX ORDER — VENLAFAXINE HYDROCHLORIDE 37.5 MG/1
37.5 CAPSULE, EXTENDED RELEASE ORAL DAILY
Qty: 90 CAPSULE | Refills: 1 | Status: SHIPPED | OUTPATIENT
Start: 2022-08-19

## 2022-08-29 RX ORDER — ZOSTER VACCINE RECOMBINANT, ADJUVANTED 50 MCG/0.5
0.5 KIT INTRAMUSCULAR ONCE
Qty: 0.5 ML | Refills: 0
Start: 2022-08-29 | End: 2022-08-29

## 2022-08-29 RX ORDER — CHLORTHALIDONE 25 MG/1
25 TABLET ORAL DAILY
Qty: 30 TABLET | Refills: 3
Start: 2022-08-29 | End: 2022-10-19

## 2022-09-27 RX ORDER — ETODOLAC 400 MG/1
TABLET, FILM COATED ORAL
Qty: 30 TABLET | Refills: 1 | Status: SHIPPED | OUTPATIENT
Start: 2022-09-27

## 2022-10-18 DIAGNOSIS — I10 HTN, GOAL BELOW 140/90: ICD-10-CM

## 2022-10-19 RX ORDER — CHLORTHALIDONE 25 MG/1
TABLET ORAL
Qty: 90 TABLET | Refills: 1 | Status: SHIPPED | OUTPATIENT
Start: 2022-10-19

## 2022-12-15 RX ORDER — ETODOLAC 400 MG/1
TABLET, FILM COATED ORAL
Qty: 30 TABLET | Refills: 1 | Status: SHIPPED | OUTPATIENT
Start: 2022-12-15

## 2023-01-05 DIAGNOSIS — R19.7 DIARRHEA DUE TO MALABSORPTION: ICD-10-CM

## 2023-01-05 DIAGNOSIS — F43.0 PANIC ATTACK AS REACTION TO STRESS: ICD-10-CM

## 2023-01-05 DIAGNOSIS — F06.4 ANXIETY DISORDER DUE TO KNOWN PHYSIOLOGICAL CONDITION: ICD-10-CM

## 2023-01-05 DIAGNOSIS — F40.01 PANIC DISORDER WITH AGORAPHOBIA: ICD-10-CM

## 2023-01-05 DIAGNOSIS — K90.9 DIARRHEA DUE TO MALABSORPTION: ICD-10-CM

## 2023-01-05 DIAGNOSIS — F41.0 PANIC ATTACK AS REACTION TO STRESS: ICD-10-CM

## 2023-01-05 DIAGNOSIS — I10 HTN, GOAL BELOW 140/90: ICD-10-CM

## 2023-01-06 RX ORDER — METOPROLOL SUCCINATE 50 MG/1
TABLET, EXTENDED RELEASE ORAL
Qty: 90 TABLET | Refills: 4 | Status: SHIPPED | OUTPATIENT
Start: 2023-01-06

## 2023-04-26 DIAGNOSIS — I10 HTN, GOAL BELOW 140/90: ICD-10-CM

## 2023-04-27 RX ORDER — CHLORTHALIDONE 25 MG/1
TABLET ORAL
Qty: 90 TABLET | Refills: 1 | Status: SHIPPED | OUTPATIENT
Start: 2023-04-27

## 2023-07-20 ENCOUNTER — TELEPHONE (OUTPATIENT)
Age: 58
End: 2023-07-20

## 2023-07-20 NOTE — TELEPHONE ENCOUNTER
[phone] (home)       Left message for patient to schedule mammogram ordered by PCP  Requested patient to return call to panel manager at [phone] to schedule mammogram or notify that mammogram   has been completed at an outside facility.      Patient had order from Dr. Ana Lilia Irene from old system 08/17/2022

## 2023-09-26 ENCOUNTER — OFFICE VISIT (OUTPATIENT)
Age: 58
End: 2023-09-26

## 2023-09-26 VITALS
SYSTOLIC BLOOD PRESSURE: 128 MMHG | RESPIRATION RATE: 18 BRPM | TEMPERATURE: 98.4 F | HEART RATE: 84 BPM | WEIGHT: 164 LBS | DIASTOLIC BLOOD PRESSURE: 83 MMHG | OXYGEN SATURATION: 98 %

## 2023-09-26 DIAGNOSIS — M16.11 OSTEOARTHRITIS OF ONE HIP, RIGHT: ICD-10-CM

## 2023-09-26 DIAGNOSIS — M25.551 PAIN OF RIGHT HIP: Primary | ICD-10-CM

## 2023-09-26 DIAGNOSIS — R73.03 PRE-DIABETES: ICD-10-CM

## 2023-09-26 LAB — HBA1C MFR BLD: 6 %

## 2023-09-26 RX ORDER — TRIAMCINOLONE ACETONIDE 40 MG/ML
40 INJECTION, SUSPENSION INTRA-ARTICULAR; INTRAMUSCULAR ONCE
Status: COMPLETED | OUTPATIENT
Start: 2023-09-26 | End: 2023-09-26

## 2023-09-26 RX ORDER — ETODOLAC 400 MG/1
TABLET, FILM COATED ORAL
Qty: 60 TABLET | Refills: 2 | Status: SHIPPED | OUTPATIENT
Start: 2023-09-26

## 2023-09-26 RX ORDER — DEXAMETHASONE SODIUM PHOSPHATE 10 MG/ML
10 INJECTION INTRAMUSCULAR; INTRAVENOUS ONCE
Status: COMPLETED | OUTPATIENT
Start: 2023-09-26 | End: 2023-09-26

## 2023-09-26 RX ADMIN — DEXAMETHASONE SODIUM PHOSPHATE 10 MG: 10 INJECTION INTRAMUSCULAR; INTRAVENOUS at 14:03

## 2023-09-26 RX ADMIN — TRIAMCINOLONE ACETONIDE 40 MG: 40 INJECTION, SUSPENSION INTRA-ARTICULAR; INTRAMUSCULAR at 14:05

## 2023-09-26 SDOH — ECONOMIC STABILITY: INCOME INSECURITY: HOW HARD IS IT FOR YOU TO PAY FOR THE VERY BASICS LIKE FOOD, HOUSING, MEDICAL CARE, AND HEATING?: NOT HARD AT ALL

## 2023-09-26 SDOH — ECONOMIC STABILITY: FOOD INSECURITY: WITHIN THE PAST 12 MONTHS, THE FOOD YOU BOUGHT JUST DIDN'T LAST AND YOU DIDN'T HAVE MONEY TO GET MORE.: NEVER TRUE

## 2023-09-26 SDOH — ECONOMIC STABILITY: FOOD INSECURITY: WITHIN THE PAST 12 MONTHS, YOU WORRIED THAT YOUR FOOD WOULD RUN OUT BEFORE YOU GOT MONEY TO BUY MORE.: NEVER TRUE

## 2023-09-26 SDOH — ECONOMIC STABILITY: HOUSING INSECURITY
IN THE LAST 12 MONTHS, WAS THERE A TIME WHEN YOU DID NOT HAVE A STEADY PLACE TO SLEEP OR SLEPT IN A SHELTER (INCLUDING NOW)?: NO

## 2023-09-26 ASSESSMENT — PATIENT HEALTH QUESTIONNAIRE - PHQ9
SUM OF ALL RESPONSES TO PHQ QUESTIONS 1-9: 0
SUM OF ALL RESPONSES TO PHQ QUESTIONS 1-9: 0
1. LITTLE INTEREST OR PLEASURE IN DOING THINGS: 0
2. FEELING DOWN, DEPRESSED OR HOPELESS: 0
SUM OF ALL RESPONSES TO PHQ9 QUESTIONS 1 & 2: 0
2. FEELING DOWN, DEPRESSED OR HOPELESS: 0
SUM OF ALL RESPONSES TO PHQ QUESTIONS 1-9: 0
SUM OF ALL RESPONSES TO PHQ QUESTIONS 1-9: 0
1. LITTLE INTEREST OR PLEASURE IN DOING THINGS: 0
SUM OF ALL RESPONSES TO PHQ QUESTIONS 1-9: 0
SUM OF ALL RESPONSES TO PHQ9 QUESTIONS 1 & 2: 0
SUM OF ALL RESPONSES TO PHQ QUESTIONS 1-9: 0

## 2023-09-26 NOTE — PROGRESS NOTES
[address] MAIN OFFICE-[facility]  OFFICE PROCEDURE PROGRESS NOTE        Chart reviewed for the following:   Monica Lorenz LPN, have reviewed the History, Physical and updated the Allergic reactions for No patient name on file. TIME OUT performed immediately prior to start of procedure:   Lokesh TORRES LPN, have performed the following reviews on No patient name on file.  prior to the start of the procedure:            * Patient was identified by name and date of birth   * Agreement on procedure being performed was verified  * Risks and Benefits explained to the patient  * Procedure site verified and marked as necessary  * Patient was positioned for comfort  * Consent was signed and verified     Time: 12:20p      Date of procedure: 9/26/23    Procedure performed by: Dr Judie Ac    Provider assisted by: Kelly Almazan lpn    Patient assisted by: self    How tolerated by patient: well    Post Procedural Pain Scale: .3    Comments: none    TIME ENDED:     PROCEDURE: right hip steroid injection    PRE-PAIN: 6    POST-PAIN: 3    SPECIMEN SENT TO LAB:none

## 2023-09-26 NOTE — PROGRESS NOTES
2023    Lindsay Lozano (:  1965) is a 56 y.o. female, here for a preventive medicine evaluation. Prediabetic state   Pt present with fasting gluc level of 111 on the last visit, bmi improved by 1, last a1c was at 6.1, currently on no diabetic meds, Feeling better since the last visit. The history is provided by the patient. Stating that this has been a 4 wks . pt's with history of sports activity, has also difficulty with walking and that the pain is worsening specially by going up and down the steps . The pain is present in the RT Hip area. is a dull pain and  is at a severity of 7/10. the pt has the AM stiffness, but ++ numbness and tingling sensations  There has been no history of extremity trauma, but works for [employer], going up and down the truck taken OTC but not helping,  no incontinence of urine nor of stool, and no lower ext Weaknesses  Currently on vacation for one wk starting today,   Patient Active Problem List   Diagnosis    Panic disorder with agoraphobia    Knee pain, bilateral    Diarrhea due to malabsorption    HTN, goal below 140/90    Sjogren's syndrome (HCC)    SS-A antibody positive    Anxiety disorder    Cervical pain (neck)    Right shoulder pain    Panic attack as reaction to stress    Hypertensive urgency       Review of Systems        Constitutional: no chills and fever,nad     HENT: no ear pain and nosebleeds. No blurred vision,   Respiratory: no shortness of breath, wheezing cough nor sore throat. Cardiovascular: Has no chest pain, leg swelling ,and racing heart . Gastrointestinal: No constipation, diarrhea, nausea and vomiting. Genitourinary: No frequency. Musculoskeletal: +++for multiple joint pain. Skin: no itching, pimples   Neurological: Negative for dizziness, no tremors  Psychiatric/Behavioral: Negative for depression,  not nervous/anxious. Prior to Visit Medications    Medication Sig Taking?  Authorizing Provider   chlorthalidone (HYGROTON)

## 2023-09-26 NOTE — PATIENT INSTRUCTIONS
Patient Education        Joint Injections: Care Instructions  Overview     Joint injections are shots into a joint, such as the knee. They may be used to put in medicines, such as pain relievers. A corticosteroid, or steroid, shot is used to reduce inflammation in tendons or joints. It is often used to treat problems such as arthritis, tendinitis, and bursitis. Steroids can be injected directly into a painful, inflamed joint. They can also help reduce inflammation of a bursa. A bursa is a sac of fluid. It cushions and lubricates areas where tendons, ligaments, skin, muscles, or bones rub against each other. A steroid shot can sometimes help with short-term pain relief when other treatments haven't worked. If steroid shots help, pain may improve for weeks or months. Follow-up care is a key part of your treatment and safety. Be sure to make and go to all appointments, and call your doctor if you are having problems. It's also a good idea to know your test results and keep a list of the medicines you take. How can you care for yourself at home? Put ice or a cold pack on the area for 10 to 20 minutes at a time. Put a thin cloth between the ice and your skin. Ask your doctor if you can take an over-the-counter pain medicine, such as acetaminophen (Tylenol), ibuprofen (Advil, Motrin), or naproxen (Aleve). Be safe with medicines. Read and follow all instructions on the label. Avoid strenuous activities for several days. In particular, avoid ones that put stress on the area where you got the shot. If you have dressings over the area, keep them clean and dry. You may remove them when your doctor tells you to. When should you call for help? Call your doctor now or seek immediate medical care if:    You have signs of infection, such as: Increased pain, swelling, warmth, or redness. Red streaks leading from the site. Pus draining from the site. A fever.    Watch closely for changes in your health, and be sure to

## 2023-11-01 DIAGNOSIS — I10 ESSENTIAL (PRIMARY) HYPERTENSION: ICD-10-CM

## 2023-11-02 NOTE — TELEPHONE ENCOUNTER
Last appointment: 9/26/23  Next appointment: none  Previous refill encounter(s): 4/27/23 #90 with 1 refill    Requested Prescriptions     Pending Prescriptions Disp Refills    chlorthalidone (HYGROTON) 25 MG tablet [Pharmacy Med Name: CHLORTHALIDONE 25 MG TABLET] 90 tablet 1     Sig: TAKE 1 TABLET BY MOUTH EVERY DAY IN THE MORNING         For Pharmacy Admin Tracking Only    Program: Medication Refill  CPA in place:    Recommendation Provided To:    Intervention Detail: New Rx: 1, reason: Patient Preference  Intervention Accepted By:   Blake Pizano Closed?:    Time Spent (min): 5

## 2023-11-03 RX ORDER — CHLORTHALIDONE 25 MG/1
TABLET ORAL
Qty: 90 TABLET | Refills: 1 | Status: SHIPPED | OUTPATIENT
Start: 2023-11-03

## 2023-11-12 NOTE — ED NOTES
Assumed care of patient from triage. Patient c/o headache w/ n/v beginning last night. Patient states her PCP prescribed her steroids and NSAIDs for RLE/right hip pain. Patient a/o x 4. Patient denies numbness/tingling to extremities.
Bedside and Verbal shift change report given to Sudhakar Dos Santos RN (oncoming nurse) by Severa Dura, RN (offgoing nurse).  Report included the following information SBAR, ED Summary, MAR and Recent Results
Dr. Armstead Blizzard notified of pt's BP of 155/93. Verbal orders received to hold ordered labetolol.
Dr. Chris Petersen notified of pt's BP of 186/89. Verbal orders received to administer previously ordered labetolol 10mg.
Arm band on/IV intact/Indwelling catheter secured and draining

## 2024-04-17 DIAGNOSIS — M25.551 PAIN OF RIGHT HIP: ICD-10-CM

## 2024-04-17 NOTE — TELEPHONE ENCOUNTER
Last appointment: 9/26/23  Next appointment: none  Previous refill encounter(s): 9/26/23 #60 with 2 refills    Requested Prescriptions     Pending Prescriptions Disp Refills    etodolac (LODINE) 400 MG tablet [Pharmacy Med Name: ETODOLAC 400 MG TABLET] 90 tablet 1     Sig: TAKE 1 TABLET BY MOUTH EVERY DAY AS NEEDED FOR PAIN         For Pharmacy Admin Tracking Only    Program: Medication Refill  CPA in place:    Recommendation Provided To:   Intervention Detail: New Rx: 1, reason: Patient Preference  Intervention Accepted By:   Gap Closed?:    Time Spent (min): 5

## 2024-04-18 RX ORDER — ETODOLAC 400 MG/1
TABLET, FILM COATED ORAL
Qty: 90 TABLET | Refills: 1 | Status: SHIPPED | OUTPATIENT
Start: 2024-04-18

## 2024-06-12 DIAGNOSIS — I10 ESSENTIAL (PRIMARY) HYPERTENSION: ICD-10-CM

## 2024-06-12 NOTE — TELEPHONE ENCOUNTER
Last appointment: 9/26/23  Next appointment: none  Previous refill encounter(s): 11/3/23 #90 with 1 refill    Requested Prescriptions     Pending Prescriptions Disp Refills    chlorthalidone (HYGROTON) 25 MG tablet [Pharmacy Med Name: CHLORTHALIDONE 25 MG TABLET] 90 tablet 1     Sig: TAKE 1 TABLET BY MOUTH EVERY DAY IN THE MORNING         For Pharmacy Admin Tracking Only    Program: Medication Refill  CPA in place:    Recommendation Provided To:   Intervention Detail: New Rx: 1, reason: Patient Preference  Intervention Accepted By:   Gap Closed?:    Time Spent (min): 5

## 2024-06-14 RX ORDER — CHLORTHALIDONE 25 MG/1
TABLET ORAL
Qty: 90 TABLET | Refills: 1 | Status: SHIPPED | OUTPATIENT
Start: 2024-06-14

## 2024-08-08 DIAGNOSIS — M25.551 PAIN OF RIGHT HIP: ICD-10-CM

## 2024-08-08 RX ORDER — ETODOLAC 400 MG
TABLET ORAL
Qty: 90 TABLET | Refills: 1 | OUTPATIENT
Start: 2024-08-08

## 2024-08-08 NOTE — TELEPHONE ENCOUNTER
Lodine was sent on 4/18/24 for #90 with 1 refill - too soon    For Pharmacy Admin Tracking Only    Program: Medication Refill  CPA in place:    Recommendation Provided To:   Intervention Detail: Discontinued Rx: 1, reason: Duplicate Therapy  Intervention Accepted By:   Gap Closed?:    Time Spent (min): 5

## 2024-12-18 DIAGNOSIS — I10 ESSENTIAL (PRIMARY) HYPERTENSION: ICD-10-CM

## 2024-12-18 NOTE — TELEPHONE ENCOUNTER
Last appointment: 9/26/23  Next appointment: none  Previous refill encounter(s): 6/14/24 #90 with 1 refill    Requested Prescriptions     Pending Prescriptions Disp Refills    chlorthalidone (HYGROTON) 25 MG tablet [Pharmacy Med Name: CHLORTHALIDONE 25 MG TABLET] 90 tablet 0     Sig: Take 1 tablet by mouth every morning Patient needs an appointment for further refills. Last appt > 1 year ago.         For Pharmacy Admin Tracking Only    Program: Medication Refill  CPA in place:    Recommendation Provided To:   Intervention Detail: New Rx: 1, reason: Patient Preference  Intervention Accepted By:   Gap Closed?:    Time Spent (min): 5

## 2024-12-19 RX ORDER — CHLORTHALIDONE 25 MG/1
25 TABLET ORAL EVERY MORNING
Qty: 90 TABLET | Refills: 0 | Status: SHIPPED | OUTPATIENT
Start: 2024-12-19

## 2025-03-29 DIAGNOSIS — I10 ESSENTIAL (PRIMARY) HYPERTENSION: ICD-10-CM

## 2025-03-31 NOTE — TELEPHONE ENCOUNTER
Last appointment: 9/26/23  Next appointment: none  Previous refill encounter(s): 12/19/24 #90    Requested Prescriptions     Pending Prescriptions Disp Refills    chlorthalidone (HYGROTON) 25 MG tablet [Pharmacy Med Name: CHLORTHALIDONE 25 MG TABLET] 30 tablet 0     Sig: TAKE 1 TABLET BY MOUTH EVERY MORNING PATIENT NEEDS AN APPOINTMENT FOR FURTHER REFILLS. LAST APPT > 1 YEAR AGO.         For Pharmacy Admin Tracking Only    Program: Medication Refill  CPA in place:    Recommendation Provided To:   Intervention Detail: New Rx: 1, reason: Patient Preference  Intervention Accepted By:   Gap Closed?:    Time Spent (min): 5

## 2025-04-02 RX ORDER — CHLORTHALIDONE 25 MG/1
25 TABLET ORAL EVERY MORNING
Qty: 30 TABLET | Refills: 0 | Status: SHIPPED | OUTPATIENT
Start: 2025-04-02

## 2025-05-03 DIAGNOSIS — I10 ESSENTIAL (PRIMARY) HYPERTENSION: ICD-10-CM

## 2025-05-05 RX ORDER — CHLORTHALIDONE 25 MG/1
25 TABLET ORAL EVERY MORNING
Qty: 90 TABLET | Refills: 1 | OUTPATIENT
Start: 2025-05-05

## 2025-05-07 DIAGNOSIS — I10 ESSENTIAL (PRIMARY) HYPERTENSION: ICD-10-CM

## 2025-05-07 RX ORDER — CHLORTHALIDONE 25 MG/1
25 TABLET ORAL EVERY MORNING
Qty: 30 TABLET | Refills: 0 | OUTPATIENT
Start: 2025-05-07

## 2025-05-07 NOTE — TELEPHONE ENCOUNTER
Duplicate    For Pharmacy Admin Tracking Only    Program: Medication Refill  CPA in place:    Recommendation Provided To:   Intervention Detail: Discontinued Rx: 1, reason: Non-adherence  Intervention Accepted By:   Gap Closed?:    Time Spent (min): 5